# Patient Record
Sex: MALE | Race: WHITE | Employment: OTHER | URBAN - METROPOLITAN AREA
[De-identification: names, ages, dates, MRNs, and addresses within clinical notes are randomized per-mention and may not be internally consistent; named-entity substitution may affect disease eponyms.]

---

## 2024-06-04 ENCOUNTER — HOSPITAL ENCOUNTER (INPATIENT)
Age: 84
LOS: 3 days | Discharge: HOME | DRG: 041 | End: 2024-06-07
Attending: EMERGENCY MEDICINE
Payer: MEDICARE

## 2024-06-04 ENCOUNTER — APPOINTMENT (OUTPATIENT)
Dept: MRI IMAGING | Age: 84
DRG: 041 | End: 2024-06-04
Payer: MEDICARE

## 2024-06-04 ENCOUNTER — APPOINTMENT (OUTPATIENT)
Dept: CT IMAGING | Age: 84
DRG: 041 | End: 2024-06-04
Payer: MEDICARE

## 2024-06-04 DIAGNOSIS — I63.9 CEREBROVASCULAR ACCIDENT (CVA), UNSPECIFIED MECHANISM (HCC): ICD-10-CM

## 2024-06-04 DIAGNOSIS — I63.412 CEREBROVASCULAR ACCIDENT (CVA) DUE TO EMBOLISM OF LEFT MIDDLE CEREBRAL ARTERY (HCC): ICD-10-CM

## 2024-06-04 DIAGNOSIS — I63.9 ISCHEMIC STROKE (HCC): Primary | ICD-10-CM

## 2024-06-04 LAB
ANION GAP SERPL CALCULATED.3IONS-SCNC: 8 MMOL/L (ref 9–16)
ANTI-XA UNFRAC HEPARIN: 0.33 IU/L
ANTI-XA UNFRAC HEPARIN: <0.1 IU/L
BASOPHILS # BLD: 0.05 K/UL (ref 0–0.2)
BASOPHILS NFR BLD: 1 % (ref 0–2)
BUN BLD-MCNC: 28 MG/DL (ref 8–26)
BUN SERPL-MCNC: 22 MG/DL (ref 8–23)
CA-I BLD-SCNC: 1.14 MMOL/L (ref 1.15–1.33)
CALCIUM SERPL-MCNC: 8.7 MG/DL (ref 8.6–10.4)
CHLORIDE BLD-SCNC: 107 MMOL/L (ref 98–107)
CHLORIDE SERPL-SCNC: 107 MMOL/L (ref 98–107)
CK SERPL-CCNC: 137 U/L (ref 39–308)
CO2 BLD CALC-SCNC: 29 MMOL/L (ref 22–30)
CO2 SERPL-SCNC: 25 MMOL/L (ref 20–31)
CREAT SERPL-MCNC: 1.2 MG/DL (ref 0.7–1.2)
EGFR, POC: 60 ML/MIN/1.73M2
EOSINOPHIL # BLD: <0.03 K/UL (ref 0–0.44)
EOSINOPHILS RELATIVE PERCENT: 0 % (ref 1–4)
ERYTHROCYTE [DISTWIDTH] IN BLOOD BY AUTOMATED COUNT: 12.4 % (ref 11.8–14.4)
ERYTHROCYTE [DISTWIDTH] IN BLOOD BY AUTOMATED COUNT: 12.4 % (ref 11.8–14.4)
GFR, ESTIMATED: 60 ML/MIN/1.73M2
GLUCOSE BLD-MCNC: 105 MG/DL (ref 74–100)
GLUCOSE SERPL-MCNC: 108 MG/DL (ref 74–99)
HCO3 VENOUS: 28.9 MMOL/L (ref 22–29)
HCT VFR BLD AUTO: 41.3 % (ref 40.7–50.3)
HCT VFR BLD AUTO: 43.7 % (ref 40.7–50.3)
HCT VFR BLD AUTO: 48 % (ref 41–53)
HGB BLD-MCNC: 13.8 G/DL (ref 13–17)
HGB BLD-MCNC: 14.3 G/DL (ref 13–17)
IMM GRANULOCYTES # BLD AUTO: <0.03 K/UL (ref 0–0.3)
IMM GRANULOCYTES NFR BLD: 0 %
INR PPP: 1.1
INR PPP: 1.1
LYMPHOCYTES NFR BLD: 1 K/UL (ref 1.1–3.7)
LYMPHOCYTES RELATIVE PERCENT: 15 % (ref 24–43)
MCH RBC QN AUTO: 30.5 PG (ref 25.2–33.5)
MCH RBC QN AUTO: 30.7 PG (ref 25.2–33.5)
MCHC RBC AUTO-ENTMCNC: 32.7 G/DL (ref 28.4–34.8)
MCHC RBC AUTO-ENTMCNC: 33.4 G/DL (ref 28.4–34.8)
MCV RBC AUTO: 91.8 FL (ref 82.6–102.9)
MCV RBC AUTO: 93.2 FL (ref 82.6–102.9)
MONOCYTES NFR BLD: 0.44 K/UL (ref 0.1–1.2)
MONOCYTES NFR BLD: 6 % (ref 3–12)
MYOGLOBIN SERPL-MCNC: 65 NG/ML (ref 28–72)
NEUTROPHILS NFR BLD: 78 % (ref 36–65)
NEUTS SEG NFR BLD: 5.34 K/UL (ref 1.5–8.1)
NRBC BLD-RTO: 0 PER 100 WBC
NRBC BLD-RTO: 0 PER 100 WBC
O2 SAT, VEN: 45.3 % (ref 60–85)
PARTIAL THROMBOPLASTIN TIME: 25.3 SEC (ref 23–36.5)
PARTIAL THROMBOPLASTIN TIME: 29.3 SEC (ref 23–36.5)
PCO2, VEN: 50.9 MM HG (ref 41–51)
PH VENOUS: 7.36 (ref 7.32–7.43)
PLATELET # BLD AUTO: 177 K/UL (ref 138–453)
PLATELET # BLD AUTO: 192 K/UL (ref 138–453)
PMV BLD AUTO: 9.5 FL (ref 8.1–13.5)
PMV BLD AUTO: 9.8 FL (ref 8.1–13.5)
PO2, VEN: 26.4 MM HG (ref 30–50)
POC ANION GAP: 7 MMOL/L (ref 7–16)
POC CREATININE: 1.2 MG/DL (ref 0.51–1.19)
POC HEMOGLOBIN (CALC): 16.2 G/DL (ref 13.5–17.5)
POC LACTIC ACID: 0.9 MMOL/L (ref 0.56–1.39)
POSITIVE BASE EXCESS, VEN: 2.2 MMOL/L (ref 0–3)
POTASSIUM BLD-SCNC: 6 MMOL/L (ref 3.5–5.1)
POTASSIUM SERPL-SCNC: 5.4 MMOL/L (ref 3.7–5.3)
PROTHROMBIN TIME: 13.7 SEC (ref 11.7–14.9)
PROTHROMBIN TIME: 13.9 SEC (ref 11.7–14.9)
RBC # BLD AUTO: 4.5 M/UL (ref 4.21–5.77)
RBC # BLD AUTO: 4.69 M/UL (ref 4.21–5.77)
SODIUM BLD-SCNC: 142 MMOL/L (ref 138–146)
SODIUM SERPL-SCNC: 140 MMOL/L (ref 136–145)
TROPONIN I SERPL HS-MCNC: 11 NG/L (ref 0–22)
WBC OTHER # BLD: 6.9 K/UL (ref 3.5–11.3)
WBC OTHER # BLD: 7.2 K/UL (ref 3.5–11.3)

## 2024-06-04 PROCEDURE — 2580000003 HC RX 258: Performed by: STUDENT IN AN ORGANIZED HEALTH CARE EDUCATION/TRAINING PROGRAM

## 2024-06-04 PROCEDURE — 83874 ASSAY OF MYOGLOBIN: CPT

## 2024-06-04 PROCEDURE — 85025 COMPLETE CBC W/AUTO DIFF WBC: CPT

## 2024-06-04 PROCEDURE — 85520 HEPARIN ASSAY: CPT

## 2024-06-04 PROCEDURE — 6370000000 HC RX 637 (ALT 250 FOR IP): Performed by: STUDENT IN AN ORGANIZED HEALTH CARE EDUCATION/TRAINING PROGRAM

## 2024-06-04 PROCEDURE — 99285 EMERGENCY DEPT VISIT HI MDM: CPT

## 2024-06-04 PROCEDURE — 36415 COLL VENOUS BLD VENIPUNCTURE: CPT

## 2024-06-04 PROCEDURE — 99233 SBSQ HOSP IP/OBS HIGH 50: CPT | Performed by: STUDENT IN AN ORGANIZED HEALTH CARE EDUCATION/TRAINING PROGRAM

## 2024-06-04 PROCEDURE — 82565 ASSAY OF CREATININE: CPT

## 2024-06-04 PROCEDURE — 6360000004 HC RX CONTRAST MEDICATION

## 2024-06-04 PROCEDURE — 84520 ASSAY OF UREA NITROGEN: CPT

## 2024-06-04 PROCEDURE — 82947 ASSAY GLUCOSE BLOOD QUANT: CPT

## 2024-06-04 PROCEDURE — 80051 ELECTROLYTE PANEL: CPT

## 2024-06-04 PROCEDURE — 82553 CREATINE MB FRACTION: CPT

## 2024-06-04 PROCEDURE — 0042T CT BRAIN PERFUSION: CPT

## 2024-06-04 PROCEDURE — 2580000003 HC RX 258

## 2024-06-04 PROCEDURE — 85027 COMPLETE CBC AUTOMATED: CPT

## 2024-06-04 PROCEDURE — 80048 BASIC METABOLIC PNL TOTAL CA: CPT

## 2024-06-04 PROCEDURE — NBSRV NON-BILLABLE SERVICE: Performed by: PSYCHIATRY & NEUROLOGY

## 2024-06-04 PROCEDURE — 84484 ASSAY OF TROPONIN QUANT: CPT

## 2024-06-04 PROCEDURE — 6370000000 HC RX 637 (ALT 250 FOR IP)

## 2024-06-04 PROCEDURE — 85014 HEMATOCRIT: CPT

## 2024-06-04 PROCEDURE — 82550 ASSAY OF CK (CPK): CPT

## 2024-06-04 PROCEDURE — 85730 THROMBOPLASTIN TIME PARTIAL: CPT

## 2024-06-04 PROCEDURE — 2000000000 HC ICU R&B

## 2024-06-04 PROCEDURE — 82330 ASSAY OF CALCIUM: CPT

## 2024-06-04 PROCEDURE — 6360000002 HC RX W HCPCS: Performed by: STUDENT IN AN ORGANIZED HEALTH CARE EDUCATION/TRAINING PROGRAM

## 2024-06-04 PROCEDURE — 70551 MRI BRAIN STEM W/O DYE: CPT

## 2024-06-04 PROCEDURE — 83605 ASSAY OF LACTIC ACID: CPT

## 2024-06-04 PROCEDURE — 85610 PROTHROMBIN TIME: CPT

## 2024-06-04 PROCEDURE — 82803 BLOOD GASES ANY COMBINATION: CPT

## 2024-06-04 RX ORDER — ASPIRIN 81 MG/1
81 TABLET, CHEWABLE ORAL DAILY
Status: ON HOLD | COMMUNITY
End: 2024-06-07 | Stop reason: HOSPADM

## 2024-06-04 RX ORDER — POLYETHYLENE GLYCOL 3350 17 G/17G
17 POWDER, FOR SOLUTION ORAL DAILY PRN
Status: DISCONTINUED | OUTPATIENT
Start: 2024-06-04 | End: 2024-06-07 | Stop reason: HOSPADM

## 2024-06-04 RX ORDER — LISINOPRIL 2.5 MG/1
2.5 TABLET ORAL DAILY
COMMUNITY

## 2024-06-04 RX ORDER — ONDANSETRON 4 MG/1
4 TABLET, ORALLY DISINTEGRATING ORAL EVERY 8 HOURS PRN
Status: DISCONTINUED | OUTPATIENT
Start: 2024-06-04 | End: 2024-06-07 | Stop reason: HOSPADM

## 2024-06-04 RX ORDER — CITALOPRAM 20 MG/1
40 TABLET ORAL DAILY
Status: DISCONTINUED | OUTPATIENT
Start: 2024-06-04 | End: 2024-06-07 | Stop reason: HOSPADM

## 2024-06-04 RX ORDER — ROSUVASTATIN CALCIUM 20 MG/1
40 TABLET, COATED ORAL NIGHTLY
Status: DISCONTINUED | OUTPATIENT
Start: 2024-06-04 | End: 2024-06-05

## 2024-06-04 RX ORDER — HEPARIN SODIUM 10000 [USP'U]/100ML
5-30 INJECTION, SOLUTION INTRAVENOUS CONTINUOUS
Status: DISCONTINUED | OUTPATIENT
Start: 2024-06-04 | End: 2024-06-05

## 2024-06-04 RX ORDER — SODIUM CHLORIDE 0.9 % (FLUSH) 0.9 %
5-40 SYRINGE (ML) INJECTION PRN
Status: DISCONTINUED | OUTPATIENT
Start: 2024-06-04 | End: 2024-06-07 | Stop reason: HOSPADM

## 2024-06-04 RX ORDER — CLOPIDOGREL 300 MG/1
300 TABLET, FILM COATED ORAL ONCE
Status: COMPLETED | OUTPATIENT
Start: 2024-06-04 | End: 2024-06-04

## 2024-06-04 RX ORDER — ASPIRIN 81 MG/1
81 TABLET ORAL DAILY
Status: DISCONTINUED | OUTPATIENT
Start: 2024-06-05 | End: 2024-06-04

## 2024-06-04 RX ORDER — TAMSULOSIN HYDROCHLORIDE 0.4 MG/1
0.4 CAPSULE ORAL DAILY
Status: DISCONTINUED | OUTPATIENT
Start: 2024-06-04 | End: 2024-06-07 | Stop reason: HOSPADM

## 2024-06-04 RX ORDER — FAMOTIDINE 20 MG/1
20 TABLET, FILM COATED ORAL 2 TIMES DAILY
COMMUNITY

## 2024-06-04 RX ORDER — ASPIRIN 81 MG/1
81 TABLET ORAL DAILY
Status: DISCONTINUED | OUTPATIENT
Start: 2024-06-04 | End: 2024-06-07 | Stop reason: HOSPADM

## 2024-06-04 RX ORDER — ENOXAPARIN SODIUM 100 MG/ML
40 INJECTION SUBCUTANEOUS DAILY
Status: CANCELLED | OUTPATIENT
Start: 2024-06-04

## 2024-06-04 RX ORDER — FLUTICASONE PROPIONATE 50 MCG
1 SPRAY, SUSPENSION (ML) NASAL DAILY
COMMUNITY
End: 2024-06-04

## 2024-06-04 RX ORDER — ACETAMINOPHEN 325 MG/1
650 TABLET ORAL EVERY 4 HOURS PRN
Status: DISCONTINUED | OUTPATIENT
Start: 2024-06-04 | End: 2024-06-07 | Stop reason: HOSPADM

## 2024-06-04 RX ORDER — SODIUM CHLORIDE 9 MG/ML
INJECTION, SOLUTION INTRAVENOUS CONTINUOUS
Status: DISCONTINUED | OUTPATIENT
Start: 2024-06-04 | End: 2024-06-05

## 2024-06-04 RX ORDER — CLOPIDOGREL BISULFATE 75 MG/1
75 TABLET ORAL DAILY
Status: DISCONTINUED | OUTPATIENT
Start: 2024-06-05 | End: 2024-06-07 | Stop reason: HOSPADM

## 2024-06-04 RX ORDER — TAMSULOSIN HYDROCHLORIDE 0.4 MG/1
0.4 CAPSULE ORAL DAILY
COMMUNITY

## 2024-06-04 RX ORDER — ONDANSETRON 2 MG/ML
4 INJECTION INTRAMUSCULAR; INTRAVENOUS EVERY 6 HOURS PRN
Status: DISCONTINUED | OUTPATIENT
Start: 2024-06-04 | End: 2024-06-07 | Stop reason: HOSPADM

## 2024-06-04 RX ORDER — FAMOTIDINE 20 MG/1
20 TABLET, FILM COATED ORAL 2 TIMES DAILY
Status: DISCONTINUED | OUTPATIENT
Start: 2024-06-04 | End: 2024-06-07

## 2024-06-04 RX ORDER — ROSUVASTATIN CALCIUM 10 MG/1
10 TABLET, COATED ORAL DAILY
Status: ON HOLD | COMMUNITY
End: 2024-06-07 | Stop reason: HOSPADM

## 2024-06-04 RX ORDER — SODIUM CHLORIDE 9 MG/ML
INJECTION, SOLUTION INTRAVENOUS PRN
Status: DISCONTINUED | OUTPATIENT
Start: 2024-06-04 | End: 2024-06-07 | Stop reason: HOSPADM

## 2024-06-04 RX ORDER — CITALOPRAM 40 MG/1
40 TABLET ORAL DAILY
COMMUNITY

## 2024-06-04 RX ORDER — SODIUM CHLORIDE 0.9 % (FLUSH) 0.9 %
5-40 SYRINGE (ML) INJECTION EVERY 12 HOURS SCHEDULED
Status: DISCONTINUED | OUTPATIENT
Start: 2024-06-04 | End: 2024-06-07 | Stop reason: HOSPADM

## 2024-06-04 RX ADMIN — FAMOTIDINE 20 MG: 20 TABLET, FILM COATED ORAL at 13:12

## 2024-06-04 RX ADMIN — IOPAMIDOL 50 ML: 755 INJECTION, SOLUTION INTRAVENOUS at 06:36

## 2024-06-04 RX ADMIN — SODIUM CHLORIDE: 9 INJECTION, SOLUTION INTRAVENOUS at 07:24

## 2024-06-04 RX ADMIN — SODIUM CHLORIDE: 9 INJECTION, SOLUTION INTRAVENOUS at 17:55

## 2024-06-04 RX ADMIN — CITALOPRAM 40 MG: 20 TABLET, FILM COATED ORAL at 14:07

## 2024-06-04 RX ADMIN — ASPIRIN 81 MG: 81 TABLET, COATED ORAL at 13:12

## 2024-06-04 RX ADMIN — CLOPIDOGREL BISULFATE 300 MG: 300 TABLET, FILM COATED ORAL at 07:24

## 2024-06-04 RX ADMIN — TAMSULOSIN HYDROCHLORIDE 0.4 MG: 0.4 CAPSULE ORAL at 14:01

## 2024-06-04 RX ADMIN — SODIUM CHLORIDE, PRESERVATIVE FREE 10 ML: 5 INJECTION INTRAVENOUS at 21:19

## 2024-06-04 RX ADMIN — HEPARIN SODIUM 10 UNITS/KG/HR: 10000 INJECTION, SOLUTION INTRAVENOUS at 11:12

## 2024-06-04 RX ADMIN — FAMOTIDINE 20 MG: 20 TABLET, FILM COATED ORAL at 21:19

## 2024-06-04 RX ADMIN — ROSUVASTATIN CALCIUM 40 MG: 20 TABLET, FILM COATED ORAL at 20:12

## 2024-06-04 ASSESSMENT — ENCOUNTER SYMPTOMS
VOMITING: 0
ABDOMINAL PAIN: 0
SHORTNESS OF BREATH: 0
SORE THROAT: 0
BACK PAIN: 0
NAUSEA: 0
RHINORRHEA: 0

## 2024-06-04 ASSESSMENT — PAIN DESCRIPTION - FREQUENCY: FREQUENCY: CONTINUOUS

## 2024-06-04 ASSESSMENT — PAIN DESCRIPTION - ONSET: ONSET: ON-GOING

## 2024-06-04 ASSESSMENT — PAIN DESCRIPTION - DESCRIPTORS
DESCRIPTORS: TIGHTNESS

## 2024-06-04 ASSESSMENT — PAIN DESCRIPTION - LOCATION
LOCATION: NECK

## 2024-06-04 ASSESSMENT — PAIN DESCRIPTION - PAIN TYPE
TYPE: CHRONIC PAIN

## 2024-06-04 ASSESSMENT — PAIN SCALES - GENERAL
PAINLEVEL_OUTOF10: 2
PAINLEVEL_OUTOF10: 1
PAINLEVEL_OUTOF10: 9

## 2024-06-04 ASSESSMENT — PAIN DESCRIPTION - ORIENTATION
ORIENTATION: MID
ORIENTATION: MID

## 2024-06-04 NOTE — ED PROVIDER NOTES
[facility] ED  Emergency Department Encounter  Emergency Medicine Resident     Pt Name:Guru Ricci  MRN: [id number]  Birthdate 1940  Date of evaluation: 6/4/24  PCP:  No primary care provider on file.  Note Started: 6:25 AM EDT      CHIEF COMPLAINT       Chief Complaint   Patient presents with    Cerebrovascular Accident       HISTORY OF PRESENT ILLNESS  (Location/Symptom, Timing/Onset, Context/Setting, Quality, Duration, Modifying Factors, Severity.)      Guru Ricci is a 83 y.o. male who presents via EMS as transfer from outside facility.  Last known well was 10 PM last night.  Presented to outside facility at 2 AM this morning due to aphasia.  Found to have an NIH of 5.  CT imaging at outside facility showed M1 occlusion.  Was given aspirin at outside facility.  Symptoms improved while at outside facility.  No thrombolytics were given due to improving symptoms.  Patient no other complaints.  No trauma.  No fevers or chills or recent illnesses.  On arrival to the emergency department patient is alert and oriented x 4, GCS 15.  No complaints at this time.    PAST MEDICAL / SURGICAL / SOCIAL / FAMILY HISTORY      has no past medical history on file.     has no past surgical history on file.    Social History     Socioeconomic History    Marital status: Not on file     Spouse name: Not on file    Number of children: Not on file    Years of education: Not on file    Highest education level: Not on file   Occupational History    Not on file   Tobacco Use    Smoking status: Not on file    Smokeless tobacco: Not on file   Substance and Sexual Activity    Alcohol use: Not on file    Drug use: Not on file    Sexual activity: Not on file   Other Topics Concern    Not on file   Social History Narrative    Not on file     Social Determinants of Health     Financial Resource Strain: Not on file   Food Insecurity: Not on file   Transportation Needs: Not on file   Physical Activity: Not on file

## 2024-06-04 NOTE — CONSULTS
Department of Neurology/Telestroke/Stroke  Resident Consult Note  Stroke Alert @ 6:20 am  Arrival at bedside @ 6:20 am    Reason for Consult:  ED Stroke Alert  Requesting Physician:  Dr. Sherwood  Endovascular Neurosurgeon:   Edmundo Lindsay MD    Stroke Team:  Will Sheehan MD      History Obtained From:  patient, electronic medical record    CHIEF COMPLAINT:       Fell out of bed    HISTORY OF PRESENT ILLNESS:       The patient is a 83 y.o. male who presents from [facility] after he was found to have a left distal M1 MCA occlusion.  Reportedly patient went to bed around 10 PM last night and fell out of bed in the middle of the night.  Wife and noted the patient was not speaking like himself and brought him to the hospital.  Initial NIH was 3.  CT head was reportedly normal, CTA showed left MCA M1 occlusion.  Patient was given 1 L normal saline bolus, loaded with aspirin 324 and transferred to [facility].    On arrival NIH is 2 for mild aphasia and mild extinction on the right.  CT perfusion was completed and shows a 73 cc mismatch in the left MCA territory.        LKW: 10 PM 6/3/2024  NIHSS: 2  Modified Owen Scale: 0  SBP: 151  Glucose: 105  CT head without contrast: No acute intracranial abnormality  TNK: Outside window  Endovascular: Left M1 MCA       PAST MEDICAL HISTORY :       Past Medical History:    No past medical history on file.    Past Surgical History:    No past surgical history on file.    Social History:   Social History     Socioeconomic History    Marital status: Not on file     Spouse name: Not on file    Number of children: Not on file    Years of education: Not on file    Highest education level: Not on file   Occupational History    Not on file   Tobacco Use    Smoking status: Not on file    Smokeless tobacco: Not on file   Substance and Sexual Activity    Alcohol use: Not on file    Drug use: Not on file    Sexual activity: Not on file   Other Topics Concern    Not on file  despite symptoms; able to carry out all usual duties and activities   [] 2: Slight disability; unable to carry out all previous activities, but able to look after own affairs without assistance   [] 3:Moderate disability; requiring some help, but able to walk without assistance   [] 4: Moderately severe disability; unable to walk and attend to bodily needs without assistance   [] 5:Severe disability; bedridden, incontinent and requiring constant nursing care and attention        LABS AND IMAGING:   CBC with Differential:  No results found for: \"WBC\", \"RBC\", \"HGB\", \"HCT\", \"PLT\", \"MCV\", \"MCH\", \"MCHC\", \"RDW\", \"NRBC\", \"BANDSPCT\", \"BLASTSPCT\", \"METASPCT\", \"LYMPHOPCT\", \"PROMYELOPCT\", \"MONOPCT\", \"MYELOPCT\", \"EOSPCT\", \"BASOPCT\", \"MONOSABS\", \"LYMPHSABS\", \"EOSABS\", \"BASOSABS\", \"DIFFTYPE\"  BMP:  No results found for: \"NA\", \"K\", \"CL\", \"CO2\", \"BUN\", \"LABALBU\", \"CREATININE\", \"CALCIUM\", \"GFRAA\", \"LABGLOM\", \"GLUCOSE\", \"GLU\"  No results found for: \"LABA1C\"  No results found for: \"LDL\", \"LDLDIRECT\"    Radiology Review:  CT perfusion: 73 cc mismatch in left MCA territory      Assessment:       Guru Ricci is a 83 y.o. male presents with aphasia and is found to have a left distal M1 occlusion.  Transferred to [facility] for evaluation by endovascular neurosurgery.      Left distal MCA M1 occlusion      Last Known Well (date and time)   10 PM 6/3/2024     Candidate for IV Tenecteplase therapy    Yes []  Risks including 6% of sich/death, benefits of potential improved thrombolysis, and alternatives to IV thrombolytics discussed with patient and/or family.    No   [x] due to the following exclusion criteria: Last well more than 4.5 hrs   Candidate for Thrombectomy   Yes [x]    No  [] due to the following exclusion criteria:       Disposition   [] General Neurology Care Status - prefer 1st floor (1C)   [] Internal Medicine General Care Status   [] NICU Status - (1B)     [] MICU Status   [] Observation Status    Stroke

## 2024-06-04 NOTE — ED NOTES
ED to inpatient nurses report      Chief Complaint:  Chief Complaint   Patient presents with    Cerebrovascular Accident     Present to ED from: Pt presents to ED from [facility] via [facility]4 for CVA sx. Pt's last known well was approximately 2200. Pt's spouse heard him moving around when he fell out of bed this morning. Pt denies n/v/d, LOC, CP, SOB, fever, chills, recent illness, change in bowel/bladder function, loss of appetite, or any other sx.  Pt is c/o chronic neck pain 9/10 currently, states that it feels like it is stiff. Pt was seen at [facility] and send to ED for further evaluation for thrombus of Left M1 and into both M2 seen on CTA.     MOA:     LOC: alert and orientated to name, place, date  Mobility: Requires assistance * 1  Oxygen Baseline: RA    Current needs required: RA   Pending ED orders:   Present condition: A&Ox4, resting comfortably on stretcher, NAD.     Why did the patient come to the ED? CVA   What is the plan? ADMIT,    Any procedures or intervention occur? Potentially vascular for thrombectomy    Mental Status:  Level of Consciousness: Alert (0)    Psych Assessment:   Psychosocial  Psychosocial (WDL): Within Defined Limits  Vital signs   Vitals:    06/04/24 0626 06/04/24 0628 06/04/24 0645 06/04/24 0705   BP: (!) 151/81  135/86 139/74   Pulse:   71 61   Resp:   14 18   Temp:  98 °F (36.7 °C)     TempSrc:  Oral     SpO2:   94% 96%   Weight:            Vitals:  Patient Vitals for the past 24 hrs:   BP Temp Temp src Pulse Resp SpO2 Weight   06/04/24 0705 139/74 -- -- 61 18 96 % --   06/04/24 0645 135/86 -- -- 71 14 94 % --   06/04/24 0628 -- 98 °F (36.7 °C) Oral -- -- -- --   06/04/24 0626 (!) 151/81 -- -- -- -- -- --   06/04/24 0625 -- -- -- -- -- -- 99.8 kg (220 lb)   06/04/24 0624 -- -- Oral 63 18 94 % --      Visit Vitals  /74   Pulse 61   Temp 98 °F (36.7 °C) (Oral)   Resp 18   Wt 99.8 kg (220 lb)   SpO2 96%        LDAs:   Peripheral IV 06/04/24 Right Forearm (Active)   Site  other components within normal limits   VENOUS BLOOD GAS, POINT OF CARE - Abnormal; Notable for the following components:    pO2, Larry 26.4 (*)     O2 Sat, Larry 45.3 (*)     All other components within normal limits   CREATININE W/GFR POINT OF CARE - Abnormal; Notable for the following components:    POC Creatinine 1.2 (*)     All other components within normal limits   UREA N (POC) - Abnormal; Notable for the following components:    POC BUN 28 (*)     All other components within normal limits   POCT GLUCOSE - Abnormal; Notable for the following components:    POC Glucose 105 (*)     All other components within normal limits   HGB/HCT   LACTIC ACID,POINT OF CARE       Electronically signed by Nori Delgado RN on 6/4/2024 at 7:16 AM

## 2024-06-04 NOTE — ED PROVIDER NOTES
[facility]   Emergency Department  Faculty Attestation       I performed a history and physical examination of the patient and discussed management with the resident. I reviewed the resident’s note and agree with the documented findings including all diagnostic interpretations and plan of care. Any areas of disagreement are noted on the chart. I was personally present for the key portions of any procedures. I have documented in the chart those procedures where I was not present during the key portions. I have reviewed the emergency nurses triage note. I agree with the chief complaint, past medical history, past surgical history, allergies, medications, social and family history as documented unless otherwise noted below.  For Physician Assistant/ Nurse Practitioner cases/documentation I have personally evaluated this patient and have completed at least one if not all key elements of the E/M (history, physical exam, and MDM). Additional findings are as noted.    Patient Name: Guru Ricci  MRN: [id number]  : 1940  Primary Care Physician: Bentley Bauer DO    Date of evaluationa: 2024   Note Started: 6:51 AM EDT    Pertinent Comments     Chief Complaint:   Chief Complaint   Patient presents with    Cerebrovascular Accident        Initial vitals: (If not listed, please see nursing documentation)  ED Triage Vitals   BP Temp Temp Source Pulse Respirations SpO2 Height Weight - Scale   24 0626 24 0628 24 0624 24 0624 24 0624 24 0624 -- 24 0625   (!) 151/81 98 °F (36.7 °C) Oral 63 18 94 %  99.8 kg (220 lb)        HPI/PE/Impression:  This is a 83 y.o. male who [facility]  Went to bed around 10 PM wife woke up to him acting abnormally.  Expressive aphasia initially.  However NIH significantly improved at [facility] facility.  CTA showed thrombus in the M1 bridging into the M2.   Transferred here for evaluation.  On exam, patient does not have any complaints at this time.  Answering questions.  Heart sounds regular.  Lungs difficult to auscultate.  Moving extremities.    Medical Decision Making  Stroke alert  Stroke team at bedside upon arrival.  Patient does still have some aphasia, some dysarthria, and mild facial droop.  Patient outside window for TNK, was not given TNK at outlying facility given that his NIH is significantly improved without invention.    Amount and/or Complexity of Data Reviewed  Independent Historian: EMS  Labs: ordered. Decision-making details documented in ED Course.    Risk  Decision regarding hospitalization.         Critical Care: There was significant risk of life threatening deterioration of patient's condition requiring my direct management. Critical care time 10 minutes, excluding any documented procedures.    Kassy Sherwood MD  Attending Emergency Physician        Kassy Sherwood MD  06/04/24 0652       Kassy Sherwood MD  06/04/24 0703

## 2024-06-04 NOTE — ED NOTES
Pt is incontinent of urine and is cleaned up, complete linen change, changed into a new brief and gown.

## 2024-06-04 NOTE — ED NOTES

## 2024-06-04 NOTE — ED NOTES
Julian contacted by [facility] to assist family to neuro ICU waiting room.

## 2024-06-04 NOTE — PROGRESS NOTES
[facility]ALTH - [facility]  PROGRESS NOTE    Shift date: 6/4/2024   Shift day: Tuesday   Shift # 1    Room # 15/15   Name: Guru Ricci                Mu-ism:    Place of Lutheran:     Referral:   requested to escort family to waiting room on 1B    Admit Date & Time: 6/4/2024  6:23 AM    Assessment:  Guru Ricci is a 83 y.o. male. Pt's wife, [place], and sister-in-law, Maria Esther, were present. They appeared calm and to be coping.      Intervention:  Writer introduced self and title as . Family engaged well in conversation as they walked to the waiting room.  provided support through active listening and words of affirmation.      Outcome:  Family was receptive to spiritual health visit and expressed gratitude for support.     Plan:  Chaplains will remain available to offer spiritual and emotional support as needed.      Electronically signed by Chaplain Oziel, on 6/4/2024 at 11:11 AM.  [facility]  [phone]      06/04/24 1108   Encounter Summary   Encounter Overview/Reason Family Care   Service Provided For Family   Referral/Consult From Nurse   Support System Spouse;Family members   Last Encounter  06/04/24   Complexity of Encounter Low   Begin Time 1055   End Time  1110   Total Time Calculated 15 min   Crisis   Type Family Care   Assessment/Intervention/Outcome   Assessment Calm;Coping   Intervention Active listening;Explored/Affirmed feelings, thoughts, concerns  (Escort to waiting room)   Outcome Engaged in conversation;Expressed feelings, needs, and concerns;Expressed Gratitude;Receptive

## 2024-06-04 NOTE — ED NOTES
Walter Ricci  [birth year]  82 yo M   LKW 10 pm  Wife heard him stumbling around  Aphasic  NIH of 5 initially   Now NIH of 1-2  CTA thrombus in distal left M1 into both M2  Spoke to stroke team   Plan to go like thrombectomy       Stroke team requesting transfer to ED

## 2024-06-04 NOTE — ED TRIAGE NOTES
Pt presents to ED from [facility] via LF4 for CVA sx. Pt's last known well was approximately 2200. Pt's spouse heard him moving around when he fell out of bed this morning. Pt denies n/v/d, LOC, CP, SOB, fever, chills, recent illness, change in bowel/bladder function, loss of appetite, or any other sx.  Pt is c/o chronic neck pain 9/10 currently, states that it feels like it is stiff. Pt was seen at [facility] and send to ED for further evaluation for thrombus of Left M1 and into both M2 seen on CTA.     Pt is A&OX4, placed on full monitor, IV established, changed into gown and given warm blankets. Labs drawn, labeled, and sent to lab. EPOC ran at bedside. Pt vitals show HTN but otherwise WNL. NIH scale assessed. Stroke team at bedside, CT scan called.White board updated, and patient is updated on plan of care. Will continue to monitor.

## 2024-06-04 NOTE — ED PROVIDER NOTES
[facility]  FACULTY HANDOFF     7:27 AM EDT  Handoff taken on the following patient from prior Attending Physician:  Pt Name: Guru Ricci  PCP:  Bentley Bauer DO    Attestation  I was available and discussed any additional care issues that arose and coordinated the management plans with the resident(s) caring for the patient during my duty period. Any areas of disagreement with resident's documentation of care or procedures are noted on the chart. I was personally present for the key portions of any/all procedures during my duty period. I have documented in the chart those procedures where I was not present during the key portions.         CHIEF COMPLAINT       Chief Complaint   Patient presents with    Cerebrovascular Accident         CURRENT MEDICATIONS     Previous Medications  Previous Medications    CITALOPRAM (CELEXA) 40 MG TABLET    Take 1 tablet by mouth daily    FAMOTIDINE (PEPCID) 20 MG TABLET    Take 1 tablet by mouth 2 times daily    FLUTICASONE (FLONASE) 50 MCG/ACT NASAL SPRAY    1 spray by Each Nostril route daily    LISINOPRIL (PRINIVIL;ZESTRIL) 2.5 MG TABLET    Take 1 tablet by mouth daily    ROSUVASTATIN (CRESTOR) 10 MG TABLET    Take 1 tablet by mouth daily       Encounter Medications  Orders Placed This Encounter   Medications    iopamidol (ISOVUE-370) 76 % injection 50 mL    clopidogrel (PLAVIX) tablet 300 mg    0.9 % sodium chloride infusion       ALLERGIES     is allergic to sulfa antibiotics.      RECENT VITALS:   Temp: 98 °F (36.7 °C),  Pulse: 61, Respirations: 18, BP: 139/74    RADIOLOGY:   CT BRAIN PERFUSION    (Results Pending)       LABS:  Labs Reviewed   STROKE PANEL - Abnormal; Notable for the following components:       Result Value    Potassium 5.4 (*)     Anion Gap 8 (*)     Glucose 108 (*)     Est, Glom Filt Rate 60 (*)     Neutrophils % 78 (*)     Lymphocytes % 15 (*)     Eosinophils % 0 (*)     Lymphocytes Absolute 1.00 (*)     All other

## 2024-06-04 NOTE — CONSULTS
Endovascular Neurosurgery Consult      Reason for evaluation: left MCA m1m2 occlusion    SUBJECTIVE:   History of Chief Complaint:      Patient is an 82 yo male with PMH of hypertension, hyperlipidemia, prostate cancer on surveillance, hypothyroidism, who present as  transfer from [facility] as  stroke alert after his found to have intraluminal thrombus in the distal m1 and proximal m2 segment  of left MCA.     Reportedly patient was doing well when he went to bed at 10 PM on 6/3/2024. This morning wife heard patient falling down and he was unable to stand up with some component of difficulty speaking suggestive of aphasia. Reports in [facility] chart, that patient lost consciousness.     [facility] NIHSS was 5 initially (2 for LOC questions, 2 for aphasia and dysarthria).  On my evaluation patient had NIHSS of 4 as detailed below. Afterwards on reexamination patient NIHSS improved to 2.         Allergies  is allergic to sulfa antibiotics.  Medications  Prior to Admission medications    Medication Sig Start Date End Date Taking? Authorizing Provider   rosuvastatin (CRESTOR) 10 MG tablet Take 1 tablet by mouth daily   Yes Danika Sarah MD   fluticasone (FLONASE) 50 MCG/ACT nasal spray 1 spray by Each Nostril route daily  Patient not taking: Reported on 6/4/2024   Yes Danika Sarah MD   famotidine (PEPCID) 20 MG tablet Take 1 tablet by mouth 2 times daily   Yes Danika Sarah MD   citalopram (CELEXA) 40 MG tablet Take 1 tablet by mouth daily   Yes Danika Sarah MD   lisinopril (PRINIVIL;ZESTRIL) 2.5 MG tablet Take 1 tablet by mouth daily   Yes ProviderDanika MD    Scheduled Meds:  Continuous Infusions:  PRN Meds:.  Past Medical History   has no past medical history on file.  Past Surgical History   has no past surgical history on file.  Social History   has no history on file for tobacco use.   has no history on file for alcohol use.   has no history on file for drug use.  Family

## 2024-06-04 NOTE — H&P
Neuro ICU History & Physical    Patient Name: Guru Ricci  Patient : 1940  Room/Bed: [facility]0122-01  Code Status: Full  Allergies:   Allergies   Allergen Reactions    Sulfa Antibiotics        CHIEF COMPLAINT     Right-sided weakness with aphasia    HPI    History Obtained From: Patient, EMR    The patient is a 83 y.o. male with PMH of HTN, BPH, HLD, GERD, presented after acute onset of difficulty with ambulation and speech this morning upon waking up.  Patient states he was feeling fine last night when he went to bed around 10 PM.  Symptoms were initially noted by the patient and his wife and he was taken to [facility] ED where he was evaluated with CT head and CTA head and neck.  CTA head and neck showed distal M1/proximal M2 segment intraluminal thrombus.  Patient's initial NIH was 5 at the outlying facility.  Telestroke team was consulted and patient was transferred to [facility] for further evaluation.  Upon arrival patient had an improved NIH of 3-4.  Patient underwent CT brain perfusion which showed 10 cc of core infarct with 73 cc of penumbra.  After extensive discussion neuroendovascular team decided to not intervene and to manage patient medically using triple therapy.    Upon initial evaluation patient appeared to be experiencing mild to moderate aphasia with mild dysarthria, no motor deficits were noted.  Patient was loaded with Plavix and started on low intensity neuro based heparin infusion protocol.    Admitted to ICU From: ED  Reason for ICU Admission: Close monitoring on triple therapy       PATIENT HISTORY   Past Medical History:    History reviewed. No pertinent past medical history.    Past Surgical History:    History reviewed. No pertinent surgical history.    Social History:   Social History     Socioeconomic History    Marital status:      Spouse name: Not on file    Number of children: Not on file    Years of education: Not on file    Highest education level:  EOMI.   ENT:  moist mucous membranes   LUNGS:  Equal air entry bilaterally   CARDIOVASCULAR:  normal s1 / s2   ABDOMEN:  Soft, no rigidity   NECK supple, symmetric, no midline tenderness to palpation    BACK without midline tenderness, step-offs or deformities    EXTREMITIES Normal ROM with no deformities   NEUROLOGIC:  Mental Status:  A & O x3,awake             Cranial Nerves:    cranial nerves II-XII are grossly intact except mild right facial asymmetry    Motor Exam:    Drift:  absent  Tone:  normal    Motor exam is symmetrical 5 out of 5 all extremities bilaterally    Sensory:    Touch:    Right Upper Extremity:  normal  Left Upper Extremity:  normal  Right Lower Extremity:  normal  Left Lower Extremity:  normal    Coordination/Dysmetria:  Unable to complete due to aphasia    Gait: Deferred   SKIN No obvious ecchymosis, rashes, or lesions    NIH Stroke Scale Total (if not done complete detailed one below):    1a.  Level of consciousness:  0 - alert; keenly responsive  1b.  Level of consciousness questions:  0 - answers both questions correctly  1c.  Level of consciousness questions:  0 - performs both tasks correctly  2.    Best Gaze:  0 - normal  3.    Visual:  0 - no visual loss  4.    Facial Palsy:  1 - minor paralysis (flattened nasolabial fold, asymmetric on smiling)  5a.  Motor left arm:  0 - no drift, limb holds 90 (or 45) degrees for full 10 seconds  5b.  Motor right arm:  0 - no drift, limb holds 90 (or 45) degrees for full 10 seconds  6a.  Motor left le - no drift; leg holds 30 degree position for full 5 seconds  6b.  Motor right le - no drift; leg holds 30 degree position for full 5 seconds  7.    Limb Ataxia:  0 - absent  8.    Sensory:  0 - normal; no sensory loss  9.    Best Language:  1 - mild to moderate aphasia; some obvious loss of fluency or facility of comprehension without significant limitation on ideas expressed or form of expression.  Reduction of speech and/or comprehension,

## 2024-06-05 ENCOUNTER — APPOINTMENT (OUTPATIENT)
Dept: CT IMAGING | Age: 84
DRG: 041 | End: 2024-06-05
Payer: MEDICARE

## 2024-06-05 PROBLEM — I63.412 CEREBROVASCULAR ACCIDENT (CVA) DUE TO EMBOLISM OF LEFT MIDDLE CEREBRAL ARTERY (HCC): Status: ACTIVE | Noted: 2024-06-05

## 2024-06-05 LAB
ANION GAP SERPL CALCULATED.3IONS-SCNC: 11 MMOL/L (ref 9–16)
ANTI-XA UNFRAC HEPARIN: 0.47 IU/L
ANTI-XA UNFRAC HEPARIN: 0.64 IU/L
BACTERIA URNS QL MICRO: ABNORMAL
BASOPHILS # BLD: 0.07 K/UL (ref 0–0.2)
BASOPHILS NFR BLD: 1 % (ref 0–2)
BILIRUB UR QL STRIP: NEGATIVE
BUN SERPL-MCNC: 14 MG/DL (ref 8–23)
CA-I BLD-SCNC: 1.08 MMOL/L (ref 1.13–1.33)
CALCIUM SERPL-MCNC: 8.3 MG/DL (ref 8.6–10.4)
CASTS #/AREA URNS LPF: ABNORMAL /LPF (ref 0–8)
CHLORIDE SERPL-SCNC: 107 MMOL/L (ref 98–107)
CHOLEST SERPL-MCNC: 120 MG/DL (ref 0–199)
CHOLESTEROL/HDL RATIO: 2
CLARITY UR: CLEAR
CO2 SERPL-SCNC: 22 MMOL/L (ref 20–31)
COLOR UR: ABNORMAL
CREAT SERPL-MCNC: 1 MG/DL (ref 0.7–1.2)
EOSINOPHIL # BLD: 0.19 K/UL (ref 0–0.44)
EOSINOPHILS RELATIVE PERCENT: 3 % (ref 1–4)
EPI CELLS #/AREA URNS HPF: ABNORMAL /HPF (ref 0–5)
ERYTHROCYTE [DISTWIDTH] IN BLOOD BY AUTOMATED COUNT: 12.4 % (ref 11.8–14.4)
EST. AVERAGE GLUCOSE BLD GHB EST-MCNC: 111 MG/DL
GFR, ESTIMATED: 73 ML/MIN/1.73M2
GLUCOSE SERPL-MCNC: 100 MG/DL (ref 74–99)
GLUCOSE UR STRIP-MCNC: NEGATIVE MG/DL
HBA1C MFR BLD: 5.5 % (ref 4–6)
HCT VFR BLD AUTO: 41.9 % (ref 40.7–50.3)
HCT VFR BLD AUTO: 42.6 % (ref 40.7–50.3)
HDLC SERPL-MCNC: 50 MG/DL
HGB BLD-MCNC: 13.2 G/DL (ref 13–17)
HGB BLD-MCNC: 13.7 G/DL (ref 13–17)
HGB UR QL STRIP.AUTO: ABNORMAL
IMM GRANULOCYTES # BLD AUTO: 0.05 K/UL (ref 0–0.3)
IMM GRANULOCYTES NFR BLD: 1 %
KETONES UR STRIP-MCNC: NEGATIVE MG/DL
LDLC SERPL CALC-MCNC: 61 MG/DL (ref 0–100)
LEUKOCYTE ESTERASE UR QL STRIP: NEGATIVE
LYMPHOCYTES NFR BLD: 2.07 K/UL (ref 1.1–3.7)
LYMPHOCYTES RELATIVE PERCENT: 29 % (ref 24–43)
MCH RBC QN AUTO: 30.8 PG (ref 25.2–33.5)
MCHC RBC AUTO-ENTMCNC: 32.2 G/DL (ref 28.4–34.8)
MCV RBC AUTO: 95.7 FL (ref 82.6–102.9)
MONOCYTES NFR BLD: 0.73 K/UL (ref 0.1–1.2)
MONOCYTES NFR BLD: 10 % (ref 3–12)
NEUTROPHILS NFR BLD: 56 % (ref 36–65)
NEUTS SEG NFR BLD: 3.96 K/UL (ref 1.5–8.1)
NITRITE UR QL STRIP: NEGATIVE
NRBC BLD-RTO: 0 PER 100 WBC
PH UR STRIP: 7 [PH] (ref 5–8)
PLATELET # BLD AUTO: 165 K/UL (ref 138–453)
PMV BLD AUTO: 10 FL (ref 8.1–13.5)
POTASSIUM SERPL-SCNC: 4.2 MMOL/L (ref 3.7–5.3)
PROT UR STRIP-MCNC: ABNORMAL MG/DL
RBC # BLD AUTO: 4.45 M/UL (ref 4.21–5.77)
RBC #/AREA URNS HPF: ABNORMAL /HPF (ref 0–4)
SODIUM SERPL-SCNC: 140 MMOL/L (ref 136–145)
SP GR UR STRIP: 1.01 (ref 1–1.03)
TRIGL SERPL-MCNC: 49 MG/DL
UROBILINOGEN UR STRIP-ACNC: NORMAL EU/DL (ref 0–1)
VLDLC SERPL CALC-MCNC: 10 MG/DL
WBC #/AREA URNS HPF: ABNORMAL /HPF (ref 0–5)
WBC OTHER # BLD: 7.1 K/UL (ref 3.5–11.3)

## 2024-06-05 PROCEDURE — 2580000003 HC RX 258

## 2024-06-05 PROCEDURE — 81001 URINALYSIS AUTO W/SCOPE: CPT

## 2024-06-05 PROCEDURE — 80061 LIPID PANEL: CPT

## 2024-06-05 PROCEDURE — 36415 COLL VENOUS BLD VENIPUNCTURE: CPT

## 2024-06-05 PROCEDURE — 97535 SELF CARE MNGMENT TRAINING: CPT

## 2024-06-05 PROCEDURE — 2060000000 HC ICU INTERMEDIATE R&B

## 2024-06-05 PROCEDURE — 82330 ASSAY OF CALCIUM: CPT

## 2024-06-05 PROCEDURE — 85025 COMPLETE CBC W/AUTO DIFF WBC: CPT

## 2024-06-05 PROCEDURE — 83036 HEMOGLOBIN GLYCOSYLATED A1C: CPT

## 2024-06-05 PROCEDURE — 6370000000 HC RX 637 (ALT 250 FOR IP): Performed by: STUDENT IN AN ORGANIZED HEALTH CARE EDUCATION/TRAINING PROGRAM

## 2024-06-05 PROCEDURE — 87086 URINE CULTURE/COLONY COUNT: CPT

## 2024-06-05 PROCEDURE — 80048 BASIC METABOLIC PNL TOTAL CA: CPT

## 2024-06-05 PROCEDURE — 97166 OT EVAL MOD COMPLEX 45 MIN: CPT

## 2024-06-05 PROCEDURE — 97530 THERAPEUTIC ACTIVITIES: CPT

## 2024-06-05 PROCEDURE — 85014 HEMATOCRIT: CPT

## 2024-06-05 PROCEDURE — 99222 1ST HOSP IP/OBS MODERATE 55: CPT | Performed by: STUDENT IN AN ORGANIZED HEALTH CARE EDUCATION/TRAINING PROGRAM

## 2024-06-05 PROCEDURE — 74178 CT ABD&PLV WO CNTR FLWD CNTR: CPT | Performed by: PHYSICIAN ASSISTANT

## 2024-06-05 PROCEDURE — 85520 HEPARIN ASSAY: CPT

## 2024-06-05 PROCEDURE — 95813 EEG EXTND MNTR 61-119 MIN: CPT

## 2024-06-05 PROCEDURE — 2580000003 HC RX 258: Performed by: STUDENT IN AN ORGANIZED HEALTH CARE EDUCATION/TRAINING PROGRAM

## 2024-06-05 PROCEDURE — 6360000004 HC RX CONTRAST MEDICATION: Performed by: PHYSICIAN ASSISTANT

## 2024-06-05 PROCEDURE — 99233 SBSQ HOSP IP/OBS HIGH 50: CPT | Performed by: STUDENT IN AN ORGANIZED HEALTH CARE EDUCATION/TRAINING PROGRAM

## 2024-06-05 PROCEDURE — 92523 SPEECH SOUND LANG COMPREHEN: CPT

## 2024-06-05 PROCEDURE — 85018 HEMOGLOBIN: CPT

## 2024-06-05 PROCEDURE — 99223 1ST HOSP IP/OBS HIGH 75: CPT | Performed by: PSYCHIATRY & NEUROLOGY

## 2024-06-05 PROCEDURE — 97161 PT EVAL LOW COMPLEX 20 MIN: CPT

## 2024-06-05 RX ORDER — ENOXAPARIN SODIUM 100 MG/ML
40 INJECTION SUBCUTANEOUS DAILY
Status: DISCONTINUED | OUTPATIENT
Start: 2024-06-05 | End: 2024-06-07 | Stop reason: HOSPADM

## 2024-06-05 RX ORDER — LABETALOL HYDROCHLORIDE 5 MG/ML
10 INJECTION, SOLUTION INTRAVENOUS EVERY 4 HOURS PRN
Status: DISCONTINUED | OUTPATIENT
Start: 2024-06-05 | End: 2024-06-07 | Stop reason: HOSPADM

## 2024-06-05 RX ORDER — ROSUVASTATIN CALCIUM 10 MG/1
10 TABLET, COATED ORAL NIGHTLY
Status: DISCONTINUED | OUTPATIENT
Start: 2024-06-05 | End: 2024-06-07

## 2024-06-05 RX ADMIN — ASPIRIN 81 MG: 81 TABLET, COATED ORAL at 08:18

## 2024-06-05 RX ADMIN — ROSUVASTATIN CALCIUM 10 MG: 10 TABLET, COATED ORAL at 21:05

## 2024-06-05 RX ADMIN — FAMOTIDINE 20 MG: 20 TABLET, FILM COATED ORAL at 21:05

## 2024-06-05 RX ADMIN — FAMOTIDINE 20 MG: 20 TABLET, FILM COATED ORAL at 08:18

## 2024-06-05 RX ADMIN — IOPAMIDOL 130 ML: 755 INJECTION, SOLUTION INTRAVENOUS at 16:01

## 2024-06-05 RX ADMIN — SODIUM CHLORIDE, PRESERVATIVE FREE 10 ML: 5 INJECTION INTRAVENOUS at 21:06

## 2024-06-05 RX ADMIN — SODIUM CHLORIDE, PRESERVATIVE FREE 10 ML: 5 INJECTION INTRAVENOUS at 08:20

## 2024-06-05 RX ADMIN — SODIUM CHLORIDE: 9 INJECTION, SOLUTION INTRAVENOUS at 03:58

## 2024-06-05 RX ADMIN — CLOPIDOGREL BISULFATE 75 MG: 75 TABLET ORAL at 08:18

## 2024-06-05 RX ADMIN — CITALOPRAM 40 MG: 20 TABLET, FILM COATED ORAL at 08:21

## 2024-06-05 RX ADMIN — TAMSULOSIN HYDROCHLORIDE 0.4 MG: 0.4 CAPSULE ORAL at 08:21

## 2024-06-05 ASSESSMENT — PAIN DESCRIPTION - DESCRIPTORS: DESCRIPTORS: ACHING

## 2024-06-05 ASSESSMENT — PAIN SCALES - GENERAL
PAINLEVEL_OUTOF10: 0
PAINLEVEL_OUTOF10: 8

## 2024-06-05 ASSESSMENT — PAIN DESCRIPTION - ORIENTATION: ORIENTATION: POSTERIOR

## 2024-06-05 ASSESSMENT — PAIN DESCRIPTION - LOCATION: LOCATION: NECK

## 2024-06-05 ASSESSMENT — ENCOUNTER SYMPTOMS
SHORTNESS OF BREATH: 0
ABDOMINAL PAIN: 0

## 2024-06-05 NOTE — PROGRESS NOTES
malnutrition  []16-16.99 Moderate malnutrition  []17-18.49 Mild malnutrition  []18.5-24.9 Normal  []25-29.9 Overweight (not obese)  []30-34.9 Obese class 1 (Low Risk)  []35-39.9 Obese class 2 (Moderate Risk)  []?40 Obese class 3 (High Risk)    RECENT LABS:   Lab Results   Component Value Date    WBC 7.1 06/05/2024    HGB 13.7 06/05/2024    HCT 42.6 06/05/2024     06/05/2024    CHOL 120 06/05/2024    TRIG 49 06/05/2024    HDL 50 06/05/2024     06/05/2024    K 4.2 06/05/2024     06/05/2024    CREATININE 1.0 06/05/2024    BUN 14 06/05/2024    CO2 22 06/05/2024    INR 1.1 06/04/2024     24 HOUR INTAKE/OUTPUT:  Intake/Output Summary (Last 24 hours) at 6/5/2024 1147  Last data filed at 6/5/2024 1040  Gross per 24 hour   Intake 3149.47 ml   Output 1150 ml   Net 1999.47 ml       IMAGING:     MRI BRAIN WO CONTRAST   Final Result   Chronic microvascular disease without definite acute intracranial abnormality.         CT BRAIN PERFUSION   Final Result   Large perfusion mismatch in the left MCA territory.         CT BRAIN PERFUSION    (Results Pending)   CT UROGRAM    (Results Pending)         Labs and Images reviewed with:  [x] Dr. Dana Cobos  [] Dr. Carson Tyler  [] Dr. Kristian Echavarria  [] There are no new interval images to review.     PHYSICAL EXAM       CONSTITUTIONAL:  Well developed, well nourished, alert and oriented x 3, in no acute distress. GCS 15. Nontoxic. No dysarthria.  No aphasia.  Patient is hard of hearing but able to follow commands once he understands them.   HEAD:  normocephalic, atraumatic    EYES:  PERRLA, EOMI.   ENT:  moist mucous membranes   LUNGS:  Equal air entry bilaterally   CARDIOVASCULAR:  normal s1 / s2   ABDOMEN:  Soft, no rigidity   NECK supple, symmetric, no midline tenderness to palpation    BACK without midline tenderness, step-offs or deformities    EXTREMITIES Normal ROM with no deformities   NEUROLOGIC:  Mental Status:  A & O x3,awake             Cranial Nerves:     cranial nerves II-XII are grossly intact except mild right facial asymmetry     Motor Exam:    Drift:  absent  Tone:  normal     Motor exam is symmetrical 5 out of 5 all extremities bilaterally     Sensory:    Touch:    Right Upper Extremity:  normal  Left Upper Extremity:  normal  Right Lower Extremity:  normal  Left Lower Extremity:  normal     Coordination/Dysmetria:  Unable to complete due to aphasia     Gait: Deferred   SKIN No obvious ecchymosis, rashes, or lesions      DRAINS:  [x] There are no drains for Neuro Critical Care to monitor at this time.     ASSESSMENT AND PLAN:       This is a 83 y.o. male presenting with acute onset difficulty ambulating and speaking noted by his wife.  Found to have left M1/M2 occlusion on CTA with intraluminal thrombus.  Exam improved hence no intervention.  Admitted to neuro ICU on triple therapy.      NEUROLOGIC:  - CTA head and neck with contrast at outside hospital showed left distal M1/proximal M2 occlusion  - CT brain perfusion showed 83 cc of hypoperfusion with 73 cc of penumbra with mismatch ratio 8.3 in the left MCA territory.  - MRI brain without contrast unremarkable for any acute infarct  - Repeat CT brain perfusion tomorrow to evaluate area of hypoperfusion  - Continue aspirin 81 mg and Plavix 75 mg daily  - Continue Crestor 10 mg daily, home dose  - LDL 61, HDL 50, HbA1c  - Routine EEG ordered to evaluate for possible epileptiform etiology of presentation, pending  - Heparin infusion discontinued  - Goal -180, will start to gradually normalize blood pressure starting today  - Neuro checks per protocol  - Home dose of Celexa resumed     CARDIOVASCULAR:  - Goal -180  - Continue telemetry     PULMONARY:  -Saturating well on room air     RENAL/FLUID/ELECTROLYTE:  - BUN 14/ Creatinine 1.0  - Monitor I's and O's  - IVF: None  - Replace electrolytes PRN  - Daily BMP     GI/NUTRITION:  NUTRITION:  ADULT DIET; Regular  - Bowel regimen: GlycoLax as

## 2024-06-05 NOTE — CONSULTS
Hanna Gamez, Amos, Blake, Makenzie, Lena, Domenico, & Jose  Urology Consultation      Patient:  Gruu Ricci  MRN: [id number]  YOB: 1940    REASON FOR CONSULT:  hematuria    HISTORY OF PRESENT ILLNESS:   The patient is a 83 y.o. male who presents with ischemic CVA started on heparin gtt, now on aspirin and plavix. Urology consulted due to development of gross hematuria overnight. Cr 1.0. Patient states he follows with Dr. Morgan for history of BPH with LUTs and had a TURP years ago. Then he developed prostate cancer and has been under active surveillance. He also has history of right kidney \"cyst\" that he states is quite large and has had intermittent hematuria for many years, usually it is microscopic though. Since his TURP he denies any LUTS. He states his urine is see-through red and he does feel he is emptying his bladder well here. He is uncertain when his last cystoscopy was. He is due to see Dr. Morgan in August. He denies flank pain, fevers, chills, dysuria.  He does not have a Smoking history. He worked for [employer] in [place] around oil, but no chemical exposure.      Patient's old records, notes and chart reviewed and summarized above.    Past Medical History:    History reviewed. No pertinent past medical history.    Past Surgical History:    History reviewed. No pertinent surgical history.    Medications:      Current Facility-Administered Medications:     rosuvastatin (CRESTOR) tablet 10 mg, 10 mg, Oral, Nightly, Kendra Montejo MD    sodium chloride flush 0.9 % injection 5-40 mL, 5-40 mL, IntraVENous, 2 times per day, Kendra Montejo MD, 10 mL at 06/05/24 0820    sodium chloride flush 0.9 % injection 5-40 mL, 5-40 mL, IntraVENous, PRN, Kendra Montejo MD    0.9 % sodium chloride infusion, , IntraVENous, PRN, Kendra Montejo MD    ondansetron (ZOFRAN-ODT) disintegrating tablet 4 mg, 4 mg, Oral, Q8H PRN **OR** ondansetron (ZOFRAN) injection 4 mg, 4 mg, IntraVENous, Q6H PRN, Gustabo, Rana H,

## 2024-06-05 NOTE — PROGRESS NOTES
SLP ALL NOTES  Facility/Department: [address] NEURO ICU  Initial Speech/Language/Cognitive Assessment    NAME: Guru Ricci  : 1940   MRN: [id number]  ADMISSION DATE: 2024  ADMITTING DIAGNOSIS: has Ischemic stroke (HCC) on their problem list.    Date of Eval: 2024   Evaluating Therapist: JOSE A GARCIA    Primary Complaint:   The patient is a 83 y.o. male with PMH of HTN, BPH, HLD, GERD, presented after acute onset of difficulty with ambulation and speech this morning upon waking up.  Patient states he was feeling fine last night when he went to bed around 10 PM.  Symptoms were initially noted by the patient and his wife and he was taken to [facility] ED where he was evaluated with CT head and CTA head and neck.  CTA head and neck showed distal M1/proximal M2 segment intraluminal thrombus.  Patient's initial NIH was 5 at the outlying facility.  Telestroke team was consulted and patient was transferred to [facility] for further evaluation.  Upon arrival patient had an improved NIH of 3-4.  Patient underwent CT brain perfusion which showed 10 cc of core infarct with 73 cc of penumbra.  After extensive discussion neuroendovascular team decided to not intervene and to manage patient medically using triple therapy.     Upon initial evaluation patient appeared to be experiencing mild to moderate aphasia with mild dysarthria, no motor deficits were noted.  Patient was loaded with Plavix and started on low intensity neuro based heparin infusion protocol.    Pain:  Pain Assessment  Pain Assessment: None - Denies Pain  Pain Level:     Vision/ Hearing  Vision  Vision: Within Functional Limits  Hearing  Hearing: Exceptions to WFL    Assessment:  Pt presents with moderate cognitive deficits characterized by difficulties with word associations, immediate recall of 3-5 units, short term recall of 3 units, abstract reasoning and deductive reasoning.   Pt. Presents with mild dysarthria, right O/M

## 2024-06-05 NOTE — PROGRESS NOTES
Left in chair, Patient at risk for falls  Restraints  Restraints Initially in Place: No     Restrictions  Restrictions/Precautions  Restrictions/Precautions: Up as Tolerated  Required Braces or Orthoses?: No  Position Activity Restriction  Other position/activity restrictions: -180 mmHg     Subjective   General  Patient assessed for rehabilitation services?: Yes  Response To Previous Treatment: Not applicable  Family / Caregiver Present: No  Follows Commands: Impaired (Pt moderately impulsive throughout session. Pt attempts to stand multiple times before writer ready.)  Subjective  Subjective: RN and pt agreeable to PT. Pt agreeable and pleasant upon writer entrance. Pt supine in bed at start of session. Pt reports 0/10 pain and denies n/t.         Social/Functional History  Social/Functional History  Lives With: Spouse  Type of Home: House  Home Layout: One level  Home Access: Stairs to enter with rails  Entrance Stairs - Number of Steps: 5  Entrance Stairs - Rails: Right  Bathroom Shower/Tub: Walk-in shower  Bathroom Toilet: Standard  Bathroom Equipment: Built-in shower seat, Grab bars in shower  Home Equipment: None  ADL Assistance: Independent  Homemaking Assistance: Independent  Ambulation Assistance: Independent  Transfer Assistance: Independent  Active : Yes  Mode of Transportation: Truck  Occupation: Retired  Type of Occupation: due departure  Additional Comments: spouse is retired and able to assist patient as needed.  Vision/Hearing  Vision  Vision: Impaired  Hearing  Hearing: Exceptions to WFL  Hearing Exceptions: Bilateral hearing aid    Cognition   Orientation  Orientation Level: Oriented X4  Cognition  Overall Cognitive Status: Exceptions  Safety Judgement: Decreased awareness of need for assistance;Decreased awareness of need for safety  Insights: Decreased awareness of deficits     Objective              AROM RLE (degrees)  RLE AROM: WFL  AROM LLE (degrees)  LLE AROM : WFL  AROM RUE

## 2024-06-05 NOTE — PLAN OF CARE
Problem: Discharge Planning  Goal: Discharge to home or other facility with appropriate resources  Outcome: Progressing  Flowsheets (Taken 6/5/2024 1301)  Discharge to home or other facility with appropriate resources: Identify barriers to discharge with patient and caregiver     Problem: Pain  Goal: Verbalizes/displays adequate comfort level or baseline comfort level  6/5/2024 1301 by Torri Mathias RN  Outcome: Progressing  Flowsheets (Taken 6/5/2024 1301)  Verbalizes/displays adequate comfort level or baseline comfort level:   Encourage patient to monitor pain and request assistance   Assess pain using appropriate pain scale   Administer analgesics based on type and severity of pain and evaluate response  6/5/2024 0529 by Francie Vann RN  Outcome: Progressing  6/5/2024 0529 by Francie Vann RN  Outcome: Progressing     Problem: Safety - Adult  Goal: Free from fall injury  6/5/2024 1301 by Torri Mathias RN  Outcome: Progressing  Flowsheets (Taken 6/5/2024 1301)  Free From Fall Injury: Instruct family/caregiver on patient safety  6/5/2024 0529 by Francie Vann RN  Outcome: Progressing  6/5/2024 0529 by Francie Vann RN  Outcome: Progressing     Problem: ABCDS Injury Assessment  Goal: Absence of physical injury  Outcome: Progressing  Flowsheets (Taken 6/5/2024 1301)  Absence of Physical Injury: Implement safety measures based on patient assessment     Problem: Neurosensory - Adult  Goal: Achieves stable or improved neurological status  6/5/2024 1301 by Torri Mathias RN  Outcome: Progressing  Flowsheets (Taken 6/5/2024 1301)  Achieves stable or improved neurological status: Assess for and report changes in neurological status  6/5/2024 0529 by Francie Vann RN  Outcome: Progressing  Goal: Absence of seizures  Outcome: Progressing  Flowsheets (Taken 6/5/2024 1301)  Absence of seizures: Monitor for seizure activity.  If seizure occurs, document type and location of movements and any associated

## 2024-06-05 NOTE — CONSULTS
Physical Medicine & Rehabilitation  Consult Note      Admitting Physician:  Lonnie Coleman DO    Primary Care Provider:  Bentley Bauer DO     Reason for Consult:  Acute Inpatient Rehabilitation    Chief Complaint:  Difficulty ambulating, speech changes    History of Present Illness:  Referring Provider is requesting an evaluation for appropriate placement upon discharge from acute care. History from chart review and patient.    Guru Ricci is a 83 y.o. male with history of HTN, HLD, BPH, GERD admitted to [facility] on 6/4/2024.      He initially presented to [facility] ED with difficulty walking and speech changes.  NIHSS 5.  CTA head/neck showed distal M1/proximal M2 segment intraluminal thrombus.  He was transferred to [place] for further management.  NIHSS 3-4.  CT brain perfusion showed large perfusion mismatch in the left MCA territory.  He was started on asprin, plavix, and heparin - now on aspirin and plavix.  MRI brain showed no acute intracranial abnormality.    He reports feeling pretty well today.  He denies any numbness/tingling, dizziness, and focal weakness.    Review of Systems:  Review of Systems   Constitutional:  Negative for fever.   Respiratory:  Negative for shortness of breath.    Cardiovascular:  Negative for chest pain.   Gastrointestinal:  Negative for abdominal pain.   Neurological:  Negative for dizziness, sensory change and focal weakness.      Premorbid function:  Independent    Current function:    Physical Therapy    Restrictions/Precautions: Up as Tolerated  Other position/activity restrictions: -180 mmHg    Bed mobility  Supine to Sit: Contact guard assistance  Scooting: Contact guard assistance  Bed Mobility Comments: Assessed with HOB elevated ~35 degrees. Sit to supine not assessed d/t pt seated in recliner upon exit.    Transfers  Sit to Stand: Contact guard assistance (Assessed with RW.)  Stand to Sit: Contact guard assistance (Assessed without

## 2024-06-05 NOTE — PLAN OF CARE
Problem: Pain  Goal: Verbalizes/displays adequate comfort level or baseline comfort level  6/5/2024 0529 by Francie Vann, RN  Outcome: Progressing     Problem: Safety - Adult  Goal: Free from fall injury  6/5/2024 0529 by Francie Vann, RN  Outcome: Progressing     Problem: Neurosensory - Adult  Goal: Achieves stable or improved neurological status  Outcome: Progressing

## 2024-06-05 NOTE — PROGRESS NOTES
Occupational Therapy  Facility/Department: [address] NEURO ICU  Occupational Therapy Initial Assessment    Name: Guru Ricci  : 1940  MRN: [id number]  Date of Service: 2024  Chief Complaint   Patient presents with    Cerebrovascular Accident       Discharge Recommendations:  Patient would benefit from continued therapy after discharge  OT Equipment Recommendations  Equipment Needed: No       Patient Diagnosis(es): The encounter diagnosis was Ischemic stroke (HCC).  Past Medical History:  has no past medical history on file.  Past Surgical History:  has no past surgical history on file.           Assessment   Performance deficits / Impairments: Decreased functional mobility ;Decreased ADL status;Decreased safe awareness;Decreased endurance;Decreased balance;Decreased high-level IADLs  Assessment: Pt would continue to benefit from OT services to address deficits listed above and improve overall functional performance prior to discharge.  Prognosis: Good  Decision Making: Medium Complexity  REQUIRES OT FOLLOW-UP: Yes  Activity Tolerance  Activity Tolerance: Patient Tolerated treatment well        Plan   Occupational Therapy Plan  Times Per Week: 3x/wk  Current Treatment Recommendations: Balance training, Functional mobility training, Endurance training, Pain management, Safety education & training, Patient/Caregiver education & training, Equipment evaluation, education, & procurement, Home management training, Self-Care / ADL     Restrictions  Restrictions/Precautions  Restrictions/Precautions: Up as Tolerated  Required Braces or Orthoses?: No  Position Activity Restriction  Other position/activity restrictions: -180 mmHg    Subjective   General  Patient assessed for rehabilitation services?: Yes  Family / Caregiver Present: No  General Comment  Comments: RN ok'd OT eval this date. Pt pleasant, cooperative, and agreeable to therapy throughout entire session. Pt reporting no pain during session  verbal cues for redirection and overall safety awareness.)     AROM: Within functional limits  Strength: Within functional limits (BUE's grossly 4+/5)  Coordination: Within functional limits  Tone: Normal  Sensation: Intact (Pt reporting no acute/chronic numbness or tingling this date)  ADL  Feeding: Independent;Setup;Based on clinical judgement  Grooming: Supervision;Based on clinical judgement  UE Bathing: Supervision;Based on clinical judgement  LE Bathing: Contact guard assistance;Based on clinical judgement  UE Dressing: Supervision;Based on clinical judgement  LE Dressing: Contact guard assistance  LE Dressing Skilled Clinical Factors: Pt sat EOB donning B socks with CGA utilizing figure-4 method.  Toileting: Contact guard assistance;Based on clinical judgement     Activity Tolerance  Activity Tolerance: Patient tolerated treatment well        Vision  Vision: Impaired  Hearing  Hearing: Exceptions to WFL  Hearing Exceptions: Bilateral hearing aid  Cognition  Overall Cognitive Status: Exceptions  Safety Judgement: Decreased awareness of need for assistance;Decreased awareness of need for safety  Insights: Decreased awareness of deficits  Orientation  Overall Orientation Status: Within Functional Limits  Orientation Level: Oriented X4                  Education Given To: Patient  Education Provided: Role of Therapy;Plan of Care;ADL Adaptive Strategies;Transfer Training;Energy Conservation  Education Method: Demonstration;Verbal  Barriers to Learning: None  Education Outcome: Verbalized understanding;Demonstrated understanding     AM-PAC - ADL  AM-PAC Daily Activity - Inpatient   How much help is needed for putting on and taking off regular lower body clothing?: A Little  How much help is needed for bathing (which includes washing, rinsing, drying)?: A Little  How much help is needed for toileting (which includes using toilet, bedpan, or urinal)?: A Little  How much help is needed for putting on and taking off

## 2024-06-05 NOTE — H&P
[facility] NEUROLOGY & NEUROSCIENCE  IN-PATIENT SERVICE    HISTORY & PHYSICAL EXAMINATION NOTE      Date:   6/5/2024  Patient name:  Guru Ricci  Date of admission:  6/4/2024  6:23 AM  MRN:   [id number]  Account:  [id number]  YOB: 1940  PCP:    Bentley Bauer DO  Room:   [address] [place] [address]  Code Status:    Full Code    Chief Complaint:     Chief Complaint   Patient presents with    Cerebrovascular Accident       History Obtained From:     patient, electronic medical record    History of Present Illness:     The patient is a right handed, 83 y.o. male with past medical hx of hypertension, hyperlipidemia, and BPH presenting for acute onset of difficulty on ambulation and speech on 6/4/2024.  Patient states he was feeling fine last night prior presenting to the ED, symptomatically noted by the patient and his wife.  He was taken to [facility] ED where he was evaluated with CT head and CTA head and neck.  CTA head and neck did show distal M1/proximal M2 intraluminal thrombus.  Patient's initial NIH was at 5 at outlying facility.  Telestroke team was consulted and patient was transferred to [facility] for further evaluation.  Upon arrival patient NIH was improved to 3 send/for.  Patient underwent CT brain perfusion which showed 10 cc of: core ischemia with 73 cc of penumbra.  After extensive discussion, neuroendovascular team is not to PLAN: intervene and to manage patient medically using triple therapy.  Per report, of initial evaluation patient appeared to be experiencing mild to moderate aphasia with mild dysarthria, no focal defects were noted.  Patient was loaded with Plavix and started on heparin drip.  On 6/5, heparin drip was discontinued due to hematuria, urologist was consulted who recommended to have urinalysis/culture follow serial urines, with postvoid bladder scan and to have CT urogram.  Patient now on aspirin and Plavix, will repeat CTP tomorrow.    Past Medical History:  0.30 k/uL   Hemoglobin A1c    Collection Time: 06/05/24  4:48 AM   Result Value Ref Range    Hemoglobin A1C 5.5 4.0 - 6.0 %    Estimated Avg Glucose 111 mg/dL   Lipid Panel    Collection Time: 06/05/24  4:48 AM   Result Value Ref Range    Cholesterol, Total 120 0 - 199 mg/dL    HDL 50 >40 mg/dL    LDL Cholesterol 61 0 - 100 mg/dL    Chol/HDL Ratio 2.0     Triglycerides 49 <150 mg/dL    VLDL 10 mg/dL   Anti-Xa, Unfractionated Heparin    Collection Time: 06/05/24  4:48 AM   Result Value Ref Range    Anti-XA Unfrac Heparin 0.64 IU/L       Imaging/Diagnostics:  MRI BRAIN WO CONTRAST    Result Date: 6/4/2024  EXAMINATION: MRI OF THE BRAIN WITHOUT CONTRAST  6/4/2024 10:37 am TECHNIQUE: Multiplanar multisequence MRI of the brain was performed without the administration of intravenous contrast. COMPARISON: CT head performed 06/04/2024. HISTORY: ORDERING SYSTEM PROVIDED HISTORY: Acute ischemic stroke TECHNOLOGIST PROVIDED HISTORY: Acute ischemic stroke Decision Support Exception - unselect if not a suspected or confirmed emergency medical condition->Emergency Medical Condition (MA) Reason for Exam: Acute ischemic stroke FINDINGS: INTRACRANIAL STRUCTURES/VENTRICLES: The sellar and suprasellar structures, optic chiasm, corpus callosum, pineal gland, tectum, and midline brainstem structures are unremarkable.  The craniocervical junction is unremarkable. There is no acute hemorrhage, mass effect, or midline shift.  There is satisfactory overall gray-white matter differentiation.  There is mild chronic microvascular disease.  The ventricular structures are symmetric and unremarkable.  The infratentorial structures including the cerebellopontine angles and internal auditory canals are unremarkable.  There is no abnormal restricted diffusion.  There is no abnormal blooming artifact on susceptibility weighted imaging. ORBITS: The visualized portion of the orbits demonstrate no acute abnormality. SINUSES: The visualized paranasal

## 2024-06-05 NOTE — CARE COORDINATION
PHQ-9  Pt present with right-sided weakness with aphasia, CVA  Met with pt his date was awake and alert  Pt states he lives with his wife. Has no children.  Has a brother and sister that are supportive.  Pt denies having any SI/depression    Patient Health Questionnaire-9 (PHQ-9)    Over the last 2 weeks, how often have you been bothered by any of the following problems?    1. Little Interest or pleasure in doing things?   [x] Not at all  [] Several Days  [] More than half the day  []  Nearly every day    2. Feeling down, depressed or hopeless?    [x] Not at all  [] Several Days  [] More than half the day  []  Nearly every day    3. Trouble falling or staying asleep, or sleeping too much?   [] Not at all  [x] Several Days  [] More than half the day  []  Nearly every day    4. Feeling tired or having little energy?   [] Not at all  [x] Several Days  [] More than half the day  []  Nearly every day    5. Poor apettite or overeating?   [x] Not at all  [] Several Days  [] More than half the day  []  Nearly every day    6. Feeling bad about yourself-or that you are a failure or have let yourself or your family down?   [x] Not at all  [] Several Days  [] More than half the day  []  Nearly every day    7. Trouble concentrating on things, such as reading the newspaper or watching television?   [x] Not at all  [] Several Days  [] More than half the day  []  Nearly every day    8. Moving or speaking so slowly that other people could have noticed? Or the opposite-being so fidgety or restless that you have been moving around a lot more than usual?   [x] Not at all  [] Several Days  [] More than half the day  []  Nearly every day    9. Thoughts that you would be better off dead or of hurting yourself in some way?   [x] Not at all  [] Several Days  [] More than half the day  []  Nearly every day    Total Score: 2    If you checked off any problems, how difficult have these problems made it for you to do your work, take care of  things at home, or get along with other people?   [x] Not difficult at all  [] Somewhat Difficult  [] Very Difficult  []  Extremely Difficult

## 2024-06-05 NOTE — PROGRESS NOTES
Endovascular Neurosurgery progress note      Reason for evaluation: left MCA m1m2 occlusion    SUBJECTIVE:   Daily event:    Patient overall has improved seems to have very minimal component of dysarthria. No weakness noted on motor examination. NIHSS examination did not show evidence of aphasia. Off heparin now, on ASA Plavix.  MRI brain WO no acute definitve stroke.       History of Chief Complaint:    Patient is an 82 yo male with PMH of hypertension, hyperlipidemia, prostate cancer on surveillance, hypothyroidism, who present as  transfer from [facility] as  stroke alert after his found to have intraluminal thrombus in the distal m1 and proximal m2 segment  of left MCA.     Reportedly patient was doing well when he went to bed at 10 PM on 6/3/2024. This morning wife heard patient falling down and he was unable to stand up with some component of difficulty speaking suggestive of aphasia. Reports in [facility] chart, that patient lost consciousness.     [facility] NIHSS was 5 initially (2 for LOC questions, 2 for aphasia and dysarthria).  On my evaluation patient had NIHSS of 4 as detailed below. Afterwards on reexamination patient NIHSS improved to 2.         Allergies  is allergic to sulfa antibiotics.  Medications  Prior to Admission medications    Medication Sig Start Date End Date Taking? Authorizing Provider   rosuvastatin (CRESTOR) 10 MG tablet Take 1 tablet by mouth daily   Yes Danika Sarah MD   famotidine (PEPCID) 20 MG tablet Take 1 tablet by mouth 2 times daily   Yes Danika Sarah MD   citalopram (CELEXA) 40 MG tablet Take 1 tablet by mouth daily   Yes Danika Sarah MD   lisinopril (PRINIVIL;ZESTRIL) 2.5 MG tablet Take 1 tablet by mouth daily   Yes Danika Sarah MD   tamsulosin (FLOMAX) 0.4 MG capsule Take 1 capsule by mouth daily   Yes Danika Sarah MD   aspirin 81 MG chewable tablet Take 1 tablet by mouth daily   Yes Danika Sarah MD    Scheduled

## 2024-06-06 ENCOUNTER — APPOINTMENT (OUTPATIENT)
Age: 84
DRG: 041 | End: 2024-06-06
Payer: MEDICARE

## 2024-06-06 ENCOUNTER — APPOINTMENT (OUTPATIENT)
Dept: CT IMAGING | Age: 84
DRG: 041 | End: 2024-06-06
Payer: MEDICARE

## 2024-06-06 LAB
ANION GAP SERPL CALCULATED.3IONS-SCNC: 9 MMOL/L (ref 9–16)
BASOPHILS # BLD: 0.06 K/UL (ref 0–0.2)
BASOPHILS NFR BLD: 1 % (ref 0–2)
BUN SERPL-MCNC: 14 MG/DL (ref 8–23)
CALCIUM SERPL-MCNC: 8.6 MG/DL (ref 8.6–10.4)
CHLORIDE SERPL-SCNC: 104 MMOL/L (ref 98–107)
CO2 SERPL-SCNC: 26 MMOL/L (ref 20–31)
CREAT SERPL-MCNC: 1.1 MG/DL (ref 0.7–1.2)
ECHO AO ROOT DIAM: 2.9 CM
ECHO AV AREA PEAK VELOCITY: 1.6 CM2
ECHO AV AREA VTI: 1.8 CM2
ECHO AV MEAN GRADIENT: 3 MMHG
ECHO AV MEAN VELOCITY: 0.9 M/S
ECHO AV PEAK GRADIENT: 7 MMHG
ECHO AV PEAK VELOCITY: 1.3 M/S
ECHO AV VELOCITY RATIO: 0.69
ECHO AV VTI: 29.4 CM
ECHO LA AREA 2C: 19 CM2
ECHO LA AREA 4C: 16.2 CM2
ECHO LA DIAMETER: 3.3 CM
ECHO LA MAJOR AXIS: 5 CM
ECHO LA MINOR AXIS: 5.7 CM
ECHO LA TO AORTIC ROOT RATIO: 1.14
ECHO LA VOL BP: 50 ML (ref 18–58)
ECHO LA VOL MOD A2C: 51 ML (ref 18–58)
ECHO LA VOL MOD A4C: 42 ML (ref 18–58)
ECHO LV E' LATERAL VELOCITY: 9 CM/S
ECHO LV E' SEPTAL VELOCITY: 7 CM/S
ECHO LV EDV 3D: 86 ML
ECHO LV EJECTION FRACTION 3D: 69 %
ECHO LV ESV 3D: 27 ML
ECHO LV FRACTIONAL SHORTENING: 42 % (ref 28–44)
ECHO LV INTERNAL DIMENSION DIASTOLIC: 4.5 CM (ref 4.2–5.9)
ECHO LV INTERNAL DIMENSION SYSTOLIC: 2.6 CM
ECHO LV IVSD: 1 CM (ref 0.6–1)
ECHO LV MASS 2D: 142.9 G (ref 88–224)
ECHO LV MASS 3D: 152 G
ECHO LV POSTERIOR WALL DIASTOLIC: 0.9 CM (ref 0.6–1)
ECHO LV RELATIVE WALL THICKNESS RATIO: 0.4
ECHO LVOT AREA: 2.3 CM2
ECHO LVOT AV VTI INDEX: 0.79
ECHO LVOT DIAM: 1.7 CM
ECHO LVOT MEAN GRADIENT: 2 MMHG
ECHO LVOT PEAK GRADIENT: 3 MMHG
ECHO LVOT PEAK VELOCITY: 0.9 M/S
ECHO LVOT SV: 52.4 ML
ECHO LVOT VTI: 23.1 CM
ECHO MV A VELOCITY: 0.73 M/S
ECHO MV AREA VTI: 1.6 CM2
ECHO MV E DECELERATION TIME (DT): 269 MS
ECHO MV E VELOCITY: 0.72 M/S
ECHO MV E/A RATIO: 0.99
ECHO MV E/E' LATERAL: 8
ECHO MV E/E' RATIO (AVERAGED): 9.14
ECHO MV E/E' SEPTAL: 10.29
ECHO MV LVOT VTI INDEX: 1.42
ECHO MV MAX VELOCITY: 0.9 M/S
ECHO MV MEAN GRADIENT: 1 MMHG
ECHO MV MEAN VELOCITY: 0.5 M/S
ECHO MV PEAK GRADIENT: 3 MMHG
ECHO MV VTI: 32.8 CM
ECHO PV MAX VELOCITY: 0.7 M/S
ECHO PV PEAK GRADIENT: 2 MMHG
ECHO PVEIN A VELOCITY: 0.4 M/S
ECHO RA AREA 4C: 17.5 CM2
ECHO RA VOLUME: 47 ML
ECHO RV BASAL DIMENSION: 3.9 CM
ECHO RV FREE WALL PEAK S': 11 CM/S
ECHO RV MID DIMENSION: 3.1 CM
ECHO RV TAPSE: 2.1 CM (ref 1.7–?)
EOSINOPHIL # BLD: 0.17 K/UL (ref 0–0.44)
EOSINOPHILS RELATIVE PERCENT: 3 % (ref 1–4)
ERYTHROCYTE [DISTWIDTH] IN BLOOD BY AUTOMATED COUNT: 12.6 % (ref 11.8–14.4)
GFR, ESTIMATED: 69 ML/MIN/1.73M2
GLUCOSE SERPL-MCNC: 90 MG/DL (ref 74–99)
HCT VFR BLD AUTO: 41.9 % (ref 40.7–50.3)
HGB BLD-MCNC: 13.8 G/DL (ref 13–17)
IMM GRANULOCYTES # BLD AUTO: 0.03 K/UL (ref 0–0.3)
IMM GRANULOCYTES NFR BLD: 0 %
LYMPHOCYTES NFR BLD: 1.7 K/UL (ref 1.1–3.7)
LYMPHOCYTES RELATIVE PERCENT: 25 % (ref 24–43)
MCH RBC QN AUTO: 30.4 PG (ref 25.2–33.5)
MCHC RBC AUTO-ENTMCNC: 32.9 G/DL (ref 28.4–34.8)
MCV RBC AUTO: 92.3 FL (ref 82.6–102.9)
MICROORGANISM SPEC CULT: NO GROWTH
MONOCYTES NFR BLD: 0.66 K/UL (ref 0.1–1.2)
MONOCYTES NFR BLD: 10 % (ref 3–12)
NEUTROPHILS NFR BLD: 61 % (ref 36–65)
NEUTS SEG NFR BLD: 4.3 K/UL (ref 1.5–8.1)
NRBC BLD-RTO: 0 PER 100 WBC
PLATELET # BLD AUTO: 177 K/UL (ref 138–453)
PMV BLD AUTO: 10.3 FL (ref 8.1–13.5)
POTASSIUM SERPL-SCNC: 4.3 MMOL/L (ref 3.7–5.3)
RBC # BLD AUTO: 4.54 M/UL (ref 4.21–5.77)
SERVICE CMNT-IMP: NORMAL
SODIUM SERPL-SCNC: 139 MMOL/L (ref 136–145)
SPECIMEN DESCRIPTION: NORMAL
WBC OTHER # BLD: 6.9 K/UL (ref 3.5–11.3)

## 2024-06-06 PROCEDURE — 2580000003 HC RX 258: Performed by: STUDENT IN AN ORGANIZED HEALTH CARE EDUCATION/TRAINING PROGRAM

## 2024-06-06 PROCEDURE — 6360000004 HC RX CONTRAST MEDICATION: Performed by: STUDENT IN AN ORGANIZED HEALTH CARE EDUCATION/TRAINING PROGRAM

## 2024-06-06 PROCEDURE — 2060000000 HC ICU INTERMEDIATE R&B

## 2024-06-06 PROCEDURE — 36415 COLL VENOUS BLD VENIPUNCTURE: CPT

## 2024-06-06 PROCEDURE — 80048 BASIC METABOLIC PNL TOTAL CA: CPT

## 2024-06-06 PROCEDURE — 93306 TTE W/DOPPLER COMPLETE: CPT | Performed by: INTERNAL MEDICINE

## 2024-06-06 PROCEDURE — 85025 COMPLETE CBC W/AUTO DIFF WBC: CPT

## 2024-06-06 PROCEDURE — 99233 SBSQ HOSP IP/OBS HIGH 50: CPT | Performed by: PSYCHIATRY & NEUROLOGY

## 2024-06-06 PROCEDURE — 6370000000 HC RX 637 (ALT 250 FOR IP): Performed by: STUDENT IN AN ORGANIZED HEALTH CARE EDUCATION/TRAINING PROGRAM

## 2024-06-06 PROCEDURE — 95718 EEG PHYS/QHP 2-12 HR W/VEEG: CPT | Performed by: PSYCHIATRY & NEUROLOGY

## 2024-06-06 PROCEDURE — 97129 THER IVNTJ 1ST 15 MIN: CPT

## 2024-06-06 PROCEDURE — 0042T CT BRAIN PERFUSION: CPT

## 2024-06-06 PROCEDURE — 93306 TTE W/DOPPLER COMPLETE: CPT

## 2024-06-06 RX ADMIN — FAMOTIDINE 20 MG: 20 TABLET, FILM COATED ORAL at 19:51

## 2024-06-06 RX ADMIN — ROSUVASTATIN CALCIUM 10 MG: 10 TABLET, COATED ORAL at 19:51

## 2024-06-06 RX ADMIN — SODIUM CHLORIDE, PRESERVATIVE FREE 10 ML: 5 INJECTION INTRAVENOUS at 19:51

## 2024-06-06 RX ADMIN — TAMSULOSIN HYDROCHLORIDE 0.4 MG: 0.4 CAPSULE ORAL at 09:45

## 2024-06-06 RX ADMIN — CLOPIDOGREL BISULFATE 75 MG: 75 TABLET ORAL at 09:45

## 2024-06-06 RX ADMIN — IOPAMIDOL 50 ML: 755 INJECTION, SOLUTION INTRAVENOUS at 10:50

## 2024-06-06 RX ADMIN — FAMOTIDINE 20 MG: 20 TABLET, FILM COATED ORAL at 09:45

## 2024-06-06 RX ADMIN — SODIUM CHLORIDE, PRESERVATIVE FREE 10 ML: 5 INJECTION INTRAVENOUS at 09:48

## 2024-06-06 RX ADMIN — ASPIRIN 81 MG: 81 TABLET, COATED ORAL at 09:45

## 2024-06-06 RX ADMIN — CITALOPRAM 40 MG: 20 TABLET, FILM COATED ORAL at 09:45

## 2024-06-06 ASSESSMENT — ENCOUNTER SYMPTOMS
ABDOMINAL PAIN: 0
CONSTIPATION: 0
WHEEZING: 0
STRIDOR: 0
NAUSEA: 0
ABDOMINAL DISTENTION: 0
CHEST TIGHTNESS: 0
COUGH: 0
SHORTNESS OF BREATH: 0
BLOOD IN STOOL: 0
DIARRHEA: 0
VOMITING: 0

## 2024-06-06 ASSESSMENT — PAIN SCALES - GENERAL
PAINLEVEL_OUTOF10: 0
PAINLEVEL_OUTOF10: 0

## 2024-06-06 NOTE — PROGRESS NOTES
[facility] Neurology   IN-PATIENT SERVICE  General Neurology Progress Note   [facility]                Date:   6/6/2024  Patient name:  Guru Ricci  Date of admission:  6/4/2024  6:23 AM  MRN:   [id number]  Account:  [id number]  YOB: 1940  PCP:    Bentley Bauer DO  Room:   [address] [place] [address]  Code Status:    Full Code  Chief Complaint:   Chief Complaint   Patient presents with    Cerebrovascular Accident       Interval history      The patient was seen and examined at bedside this morning. Exam unchanged from day prior.    Labs, chart, and VS reviewed. No acute events overnight.     Brief History     Guru Ricci is a 83 y.o. male with PMH of hypertension, hyperlipidemia, and BPH who presented for acute onset of difficulty with ambulation and speech on 6/4/2024.  Patient was initially seen at [facility] ED.  CT head and CTA head and neck.  CTA showed distal M1/proximal M2 intraluminal thrombus.  Initial NIH was 5 at outside facility.  Telestroke team was consulted, and the patient was transferred to [place] for further evaluation and management.  Upon arrival to this facility, NIH was improved to 3.  Patient had CT perfusion which showed 10 cc ischemic core with 73 cc of penumbra.  After discussion with the patient and family, endovascular neurosurgery did not proceed with any intervention, and manage the patient medically with triple therapy: Heparin, aspirin, and Plavix.  On 6/5, heparin drip was discontinued due to hematuria, urology was consulted who proceeded with CT urogram.  Patient transferred out of neuro ICU 6/5 with plan to repeat CT perfusion on 6/6.  Patient also has remote history of 2 isolated seizures about 40 years ago, never had any recurrence, and was never on any antiseizure medication.  EEG was done which was normal.  PM&R consulted, patient is too high functioning at this time for ARU.      Review of Systems   Review of Systems  right-sided hemiparesis, aphasia, and was found to have left M1/M2 occlusion with intraluminal thrombus.  Large area of penumbra left MCA region.  Patient was started on triple therapy.  MRI brain read as no acute intracranial abnormalities.  Residual mild dysarthria, decreased right nasolabial fold.  No right-sided weakness.  Isolated remote seizure history, EEG normal.    Right-sided hemiparesis, aphasia 2/2 M1/M2 intraluminal thrombus    No intervention due to significant improvement in exam, still residual mild dysarthria decreased right nasolabial fold  Possible distal left MCA distribution area of restricted diffusion, MRI read as no acute intracranial abnormalities  NEV following, plan to   repeat CT perfusion, pending  Continue DAPT  PMR consulted, too high functioning for ARU  Remote isolated seizure episodes >40 years ago, EEG nrml    Hematuria    Urology consulted, CT urogram showed bilateral simple renal cysts, and 2 mm nonobstructing left renal stone  Checking UA and urine culture   plan cystoscopy as outpatient    Consults:  Physical therapy  Occupational therapy  Speech therapy  Case Management  Social Work    Diet: Regular adult diet  DVT Prophylaxis: Lovenox, held due to hematuria  Discharge Planning:    Patient to be discussed with attending physician, Dr. Roshan Spencer MD  Transitional Year Resident PGY-1  6/6/2024 9:35 AM    Copy sent to Bentley Agudelo,     This note is created with the assistance of a speech-recognition program. While intending to generate a document that actually reflects the content of the visit, the document can still have some errors including those of syntax and sound a- like substitutions which may escape proofreading. In such instances, actual meaning can be extrapolated by contextual derivation.

## 2024-06-06 NOTE — PROCEDURES
PROCEDURE NOTE  Date: 6/6/2024   Name: Guru Ricci  YOB: 1940    Procedures    EEG REPORT     CLINICAL NEUROPHYSIOLOGY LABORATORY  DEPARTMENT OF NEUROLOGY  [facility]       Patient: Guru Ricci Age: 83 y.o.  MRN: [id number]      Referring Provider: No ref. provider found    History: This routine 1 hour 54 minute scalp EEG was recorded with video- monitoring for a 83 y.o.. male  who presented with encephalopathy. This EEG was performed to evaluate for focal and epileptiform abnormalities.     Guru Ricci   Current Facility-Administered Medications   Medication Dose Route Frequency Provider Last Rate Last Admin    rosuvastatin (CRESTOR) tablet 10 mg  10 mg Oral Nightly Kendra Montejo MD   10 mg at 06/05/24 2105    [Held by provider] enoxaparin (LOVENOX) injection 40 mg  40 mg SubCUTAneous Daily Kendra Montejo MD        labetalol (NORMODYNE;TRANDATE) injection 10 mg  10 mg IntraVENous Q4H PRN Sarita Amato MD        sodium chloride flush 0.9 % injection 5-40 mL  5-40 mL IntraVENous 2 times per day Kendra Montejo MD   10 mL at 06/05/24 2106    sodium chloride flush 0.9 % injection 5-40 mL  5-40 mL IntraVENous PRN Kendra Montejo MD        0.9 % sodium chloride infusion   IntraVENous PRN Kendra Montejo MD        ondansetron (ZOFRAN-ODT) disintegrating tablet 4 mg  4 mg Oral Q8H PRN Kendra Montejo MD        Or    ondansetron (ZOFRAN) injection 4 mg  4 mg IntraVENous Q6H PRN Kendra Montejo MD        polyethylene glycol (GLYCOLAX) packet 17 g  17 g Oral Daily PRN Kendra Montejo MD        perflutren lipid microspheres (DEFINITY) injection 1.5 mL  1.5 mL IntraVENous ONCE PRN Kendra Montejo MD        clopidogrel (PLAVIX) tablet 75 mg  75 mg Oral Daily Kendra Montejo MD   75 mg at 06/05/24 0818    aspirin EC tablet 81 mg  81 mg Oral Daily Kendra Montejo MD   81 mg at 06/05/24 0818    citalopram (CELEXA) tablet 40 mg  40 mg Oral Daily Kendra Montejo MD   40 mg at 06/05/24 0821    famotidine (PEPCID)  tablet 20 mg  20 mg Oral BID Kendra Montejo MD   20 mg at 06/05/24 2105    tamsulosin (FLOMAX) capsule 0.4 mg  0.4 mg Oral Daily Kendra Montejo MD   0.4 mg at 06/05/24 0821    acetaminophen (TYLENOL) tablet 650 mg  650 mg Oral Q4H PRN Payton Howard, APRN - CNP            Technical Description: This is a 21 channel digital EEG recording with time-locked video. Electrodes were placed in accordance with the 10-20 International System of Electrode Placement. Single lead EKG monitoring as well as temporal electrodes were included.    The patient was not sleep deprived. This recording was obtained during wakefulness.  EEG Description: The dominant background activity during maximal recorded wakefulness consisted of bioccipitally dominant 8.5 Hz, 25-35 uV symmetric, regular activity that was reactive to eye opening. During drowsiness, the background rhythm waxed and waned and there were periods of slowing. During stage II sleep symmetric V waves, K complexes, and sleep spindles were seen. There was appropriate diffuse delta activity during slow wave sleep.    Photic stimulation - stepwise photic stimulation at 2-30 Hz was performed and there was a biposterior, symmetric, driving response.  Hyperventilation - was  preformed.     No abnormalities were activated by photic stimulation     The EKG channel demonstrated a normal sinus rhythm.    Interpretation  This EEG was normal in wakefulness and sleep.     Clinical correlation  This EEG was normal. No focal or epileptiform abnormalities were seen.    Alex Casas MD  [facility]

## 2024-06-06 NOTE — PROGRESS NOTES
SLP ALL NOTES  Speech Language Pathology  [facility]    Cognitive Treatment Note    Date: 6/6/2024  Patient’s Name: Guru Ricci  MRN: [id number]  Diagnosis:   Patient Active Problem List   Diagnosis Code    Ischemic stroke (HCC) I63.9    Cerebrovascular accident (CVA) due to embolism of left middle cerebral artery (HCC) I63.412       Pain: 0/10    Cognitive Treatment    Treatment time: 1445 - 1501    Subjective: [x] Alert [x] Cooperative     [] Confused     [] Agitated    [] Lethargic    Objective/Assessment:  Attention: WFL    Orientation: WFL    Recall: delayed recall 1/3, 1/3 (both 3/3 with mod verbal cue)    Problem Solving/Reasoning: stating situational problems 2/12 (12/12 with min-mod verbal cue), word deductions 6/8 (8/8 with mod verbal cue)    Plan:  [x] Continue ST services    [] Discharge from ST:      Discharge recommendations: []  Further therapy recommended at discharge.The patient should be able to tolerate at least 3 hours of therapy per day over 5 days or 15 hours over 7 days. [x] Further therapy recommended at discharge.   [] No therapy recommended at discharge.        Completed by: Siena Swanson  Clinician  Cosigned By: Lottie Toro M.S.CCC/SLP

## 2024-06-06 NOTE — CARE COORDINATION
Case Management Assessment  Initial Evaluation    Date/Time of Evaluation: 6/6/2024 2:15 PM  Assessment Completed by: Lo Briones RN    If patient is discharged prior to next notation, then this note serves as note for discharge by case management.    Patient Name: Guru Ricci                   YOB: 1940  Diagnosis: Acute CVA (cerebrovascular accident) (HCC) [I63.9]  Ischemic stroke (HCC) [I63.9]                   Date / Time: 6/4/2024  6:23 AM    Patient Admission Status: Inpatient   Readmission Risk (Low < 19, Mod (19-27), High > 27): Readmission Risk Score: 11.6    Current PCP: Alberto Bauer, DO  PCP verified by CM? (P) Yes (Dr. Alberto Bauer)    Chart Reviewed: Yes      History Provided by: (P) Patient  Patient Orientation: (P) Alert and Oriented, Person, Place, Situation    Patient Cognition: (P) Alert    Hospitalization in the last 30 days (Readmission):  No    If yes, Readmission Assessment in  Navigator will be completed.    Advance Directives:      Code Status: Full Code   Patient's Primary Decision Maker is: (P) Legal Next of Kin      Discharge Planning:    Patient lives with: (P) Spouse/Significant Other Type of Home: (P) House  Primary Care Giver: (P) Self  Patient Support Systems include: (P) Spouse/Significant Other   Current Financial resources: (P) Medicare  Current community resources: (P) None  Current services prior to admission: (P) None            Current DME:              Type of Home Care services:  (P) None    ADLS  Prior functional level: (P) Independent in ADLs/IADLs  Current functional level: (P) Assistance with the following:, Toileting, Mobility    PT AM-PAC: 19 /24  OT AM-PAC: 19 /24    Family can provide assistance at DC: (P) Yes  Would you like Case Management to discuss the discharge plan with any other family members/significant others, and if so, who? (P) No  Plans to Return to Present Housing: (P) Yes  Other Identified Issues/Barriers to RETURNING

## 2024-06-06 NOTE — PROGRESS NOTES
Endovascular Neurosurgery progress note      Reason for evaluation: left MCA m1m2 occlusion    SUBJECTIVE:   Daily event:    No acute events overnight, patient out of ICU status. Examination unchanged, compared to day prior.   Currently pending repeat CTP. EEG completed.       History of Chief Complaint:    Patient is an 84 yo male with PMH of hypertension, hyperlipidemia, prostate cancer on surveillance, hypothyroidism, who present as  transfer from [facility] as  stroke alert after his found to have intraluminal thrombus in the distal m1 and proximal m2 segment  of left MCA.     Reportedly patient was doing well when he went to bed at 10 PM on 6/3/2024. This morning wife heard patient falling down and he was unable to stand up with some component of difficulty speaking suggestive of aphasia. Reports in [facility] chart, that patient lost consciousness.     [facility] NIHSS was 5 initially (2 for LOC questions, 2 for aphasia and dysarthria).  On my evaluation patient had NIHSS of 4 as detailed below. Afterwards on reexamination patient NIHSS improved to 2.     MRI brain was without acute stroke. Patient completed triple therapy for one day and  now on DAPT    Allergies  is allergic to sulfa antibiotics.  Medications  Prior to Admission medications    Medication Sig Start Date End Date Taking? Authorizing Provider   rosuvastatin (CRESTOR) 10 MG tablet Take 1 tablet by mouth daily   Yes Danika Sarah MD   famotidine (PEPCID) 20 MG tablet Take 1 tablet by mouth 2 times daily   Yes Danika Sarah MD   citalopram (CELEXA) 40 MG tablet Take 1 tablet by mouth daily   Yes Danika Sarah MD   lisinopril (PRINIVIL;ZESTRIL) 2.5 MG tablet Take 1 tablet by mouth daily   Yes Danika Sarah MD   tamsulosin (FLOMAX) 0.4 MG capsule Take 1 capsule by mouth daily   Yes Danika Sarah MD   aspirin 81 MG chewable tablet Take 1 tablet by mouth daily   Yes Danika Sarah MD    Scheduled

## 2024-06-06 NOTE — PLAN OF CARE
Problem: Safety - Adult  Goal: Free from fall injury  Outcome: Progressing  Flowsheets (Taken 6/5/2024 2000)  Free From Fall Injury: Instruct family/caregiver on patient safety

## 2024-06-06 NOTE — PLAN OF CARE
Problem: Discharge Planning  Goal: Discharge to home or other facility with appropriate resources  Outcome: Progressing  Flowsheets (Taken 6/6/2024 0800)  Discharge to home or other facility with appropriate resources:   Identify barriers to discharge with patient and caregiver   Arrange for needed discharge resources and transportation as appropriate   Identify discharge learning needs (meds, wound care, etc)   Arrange for interpreters to assist at discharge as needed   Refer to discharge planning if patient needs post-hospital services based on physician order or complex needs related to functional status, cognitive ability or social support system     Problem: Pain  Goal: Verbalizes/displays adequate comfort level or baseline comfort level  Outcome: Progressing     Problem: Safety - Adult  Goal: Free from fall injury  6/6/2024 1411 by Romy Ramirez, RN  Outcome: Progressing  Flowsheets (Taken 6/6/2024 0947)  Free From Fall Injury:   Instruct family/caregiver on patient safety   Based on caregiver fall risk screen, instruct family/caregiver to ask for assistance with transferring infant if caregiver noted to have fall risk factors  6/6/2024 0630 by Kim Astorga, RN  Outcome: Progressing  Flowsheets (Taken 6/5/2024 2000)  Free From Fall Injury: Instruct family/caregiver on patient safety     Problem: ABCDS Injury Assessment  Goal: Absence of physical injury  Outcome: Progressing  Flowsheets (Taken 6/6/2024 0947)  Absence of Physical Injury: Implement safety measures based on patient assessment     Problem: Neurosensory - Adult  Goal: Achieves stable or improved neurological status  Outcome: Progressing  Flowsheets (Taken 6/6/2024 0800)  Achieves stable or improved neurological status:   Assess for and report changes in neurological status   Initiate measures to prevent increased intracranial pressure   Maintain blood pressure and fluid volume within ordered parameters to optimize cerebral perfusion and  Symptoms are Monitored and Maintained or Improved:   Monitor and assess patient's chronic conditions and comorbid symptoms for stability, deterioration, or improvement   Collaborate with multidisciplinary team to address chronic and comorbid conditions and prevent exacerbation or deterioration   Update acute care plan with appropriate goals if chronic or comorbid symptoms are exacerbated and prevent overall improvement and discharge     Problem: Skin/Tissue Integrity  Goal: Absence of new skin breakdown  Description: 1.  Monitor for areas of redness and/or skin breakdown  2.  Assess vascular access sites hourly  3.  Every 4-6 hours minimum:  Change oxygen saturation probe site  4.  Every 4-6 hours:  If on nasal continuous positive airway pressure, respiratory therapy assess nares and determine need for appliance change or resting period.  Outcome: Progressing

## 2024-06-07 ENCOUNTER — APPOINTMENT (OUTPATIENT)
Dept: CT IMAGING | Age: 84
DRG: 041 | End: 2024-06-07
Payer: MEDICARE

## 2024-06-07 ENCOUNTER — APPOINTMENT (OUTPATIENT)
Age: 84
DRG: 041 | End: 2024-06-07
Attending: INTERNAL MEDICINE
Payer: MEDICARE

## 2024-06-07 VITALS
BODY MASS INDEX: 27.64 KG/M2 | HEART RATE: 52 BPM | TEMPERATURE: 98.2 F | WEIGHT: 186.6 LBS | SYSTOLIC BLOOD PRESSURE: 149 MMHG | HEIGHT: 69 IN | OXYGEN SATURATION: 94 % | DIASTOLIC BLOOD PRESSURE: 76 MMHG | RESPIRATION RATE: 16 BRPM

## 2024-06-07 LAB
ANION GAP SERPL CALCULATED.3IONS-SCNC: 9 MMOL/L (ref 9–16)
BASOPHILS # BLD: 0.08 K/UL (ref 0–0.2)
BASOPHILS NFR BLD: 1 % (ref 0–2)
BUN SERPL-MCNC: 17 MG/DL (ref 8–23)
CALCIUM SERPL-MCNC: 8.9 MG/DL (ref 8.6–10.4)
CHLORIDE SERPL-SCNC: 105 MMOL/L (ref 98–107)
CO2 SERPL-SCNC: 27 MMOL/L (ref 20–31)
CREAT SERPL-MCNC: 1.2 MG/DL (ref 0.7–1.2)
EOSINOPHIL # BLD: 0.17 K/UL (ref 0–0.44)
EOSINOPHILS RELATIVE PERCENT: 3 % (ref 1–4)
ERYTHROCYTE [DISTWIDTH] IN BLOOD BY AUTOMATED COUNT: 12.5 % (ref 11.8–14.4)
GFR, ESTIMATED: 62 ML/MIN/1.73M2
GLUCOSE SERPL-MCNC: 103 MG/DL (ref 74–99)
HCT VFR BLD AUTO: 44.3 % (ref 40.7–50.3)
HGB BLD-MCNC: 14.2 G/DL (ref 13–17)
IMM GRANULOCYTES # BLD AUTO: <0.03 K/UL (ref 0–0.3)
IMM GRANULOCYTES NFR BLD: 0 %
LYMPHOCYTES NFR BLD: 1.73 K/UL (ref 1.1–3.7)
LYMPHOCYTES RELATIVE PERCENT: 27 % (ref 24–43)
MCH RBC QN AUTO: 30.5 PG (ref 25.2–33.5)
MCHC RBC AUTO-ENTMCNC: 32.1 G/DL (ref 28.4–34.8)
MCV RBC AUTO: 95.3 FL (ref 82.6–102.9)
MONOCYTES NFR BLD: 0.71 K/UL (ref 0.1–1.2)
MONOCYTES NFR BLD: 11 % (ref 3–12)
NEUTROPHILS NFR BLD: 58 % (ref 36–65)
NEUTS SEG NFR BLD: 3.74 K/UL (ref 1.5–8.1)
NRBC BLD-RTO: 0 PER 100 WBC
PLATELET # BLD AUTO: 170 K/UL (ref 138–453)
PMV BLD AUTO: 10 FL (ref 8.1–13.5)
POTASSIUM SERPL-SCNC: 4.2 MMOL/L (ref 3.7–5.3)
RBC # BLD AUTO: 4.65 M/UL (ref 4.21–5.77)
SODIUM SERPL-SCNC: 141 MMOL/L (ref 136–145)
WBC OTHER # BLD: 6.5 K/UL (ref 3.5–11.3)

## 2024-06-07 PROCEDURE — 33285 INSJ SUBQ CAR RHYTHM MNTR: CPT | Performed by: INTERNAL MEDICINE

## 2024-06-07 PROCEDURE — 2580000003 HC RX 258: Performed by: STUDENT IN AN ORGANIZED HEALTH CARE EDUCATION/TRAINING PROGRAM

## 2024-06-07 PROCEDURE — 93312 ECHO TRANSESOPHAGEAL: CPT

## 2024-06-07 PROCEDURE — 6370000000 HC RX 637 (ALT 250 FOR IP): Performed by: STUDENT IN AN ORGANIZED HEALTH CARE EDUCATION/TRAINING PROGRAM

## 2024-06-07 PROCEDURE — 2709999900 HC NON-CHARGEABLE SUPPLY: Performed by: INTERNAL MEDICINE

## 2024-06-07 PROCEDURE — 99222 1ST HOSP IP/OBS MODERATE 55: CPT | Performed by: INTERNAL MEDICINE

## 2024-06-07 PROCEDURE — 80048 BASIC METABOLIC PNL TOTAL CA: CPT

## 2024-06-07 PROCEDURE — 36415 COLL VENOUS BLD VENIPUNCTURE: CPT

## 2024-06-07 PROCEDURE — 99232 SBSQ HOSP IP/OBS MODERATE 35: CPT | Performed by: PSYCHIATRY & NEUROLOGY

## 2024-06-07 PROCEDURE — 6370000000 HC RX 637 (ALT 250 FOR IP): Performed by: INTERNAL MEDICINE

## 2024-06-07 PROCEDURE — 0JH602Z INSERTION OF MONITORING DEVICE INTO CHEST SUBCUTANEOUS TISSUE AND FASCIA, OPEN APPROACH: ICD-10-PCS | Performed by: INTERNAL MEDICINE

## 2024-06-07 PROCEDURE — 99152 MOD SED SAME PHYS/QHP 5/>YRS: CPT | Performed by: INTERNAL MEDICINE

## 2024-06-07 PROCEDURE — B24BZZ4 ULTRASONOGRAPHY OF HEART WITH AORTA, TRANSESOPHAGEAL: ICD-10-PCS | Performed by: INTERNAL MEDICINE

## 2024-06-07 PROCEDURE — 85025 COMPLETE CBC W/AUTO DIFF WBC: CPT

## 2024-06-07 PROCEDURE — 70498 CT ANGIOGRAPHY NECK: CPT

## 2024-06-07 PROCEDURE — 6360000002 HC RX W HCPCS: Performed by: INTERNAL MEDICINE

## 2024-06-07 PROCEDURE — 6360000004 HC RX CONTRAST MEDICATION

## 2024-06-07 PROCEDURE — C1892 INTRO/SHEATH,FIXED,PEEL-AWAY: HCPCS | Performed by: INTERNAL MEDICINE

## 2024-06-07 PROCEDURE — C1764 EVENT RECORDER, CARDIAC: HCPCS | Performed by: INTERNAL MEDICINE

## 2024-06-07 PROCEDURE — 97129 THER IVNTJ 1ST 15 MIN: CPT

## 2024-06-07 DEVICE — ICM LNQ22 LINQ II PRIME US
Type: IMPLANTABLE DEVICE | Status: FUNCTIONAL
Brand: LINQ II™

## 2024-06-07 RX ORDER — FAMOTIDINE 20 MG/1
20 TABLET, FILM COATED ORAL DAILY
Status: DISCONTINUED | OUTPATIENT
Start: 2024-06-08 | End: 2024-06-07 | Stop reason: HOSPADM

## 2024-06-07 RX ORDER — SODIUM CHLORIDE 0.9 % (FLUSH) 0.9 %
5-40 SYRINGE (ML) INJECTION EVERY 12 HOURS SCHEDULED
Status: CANCELLED | OUTPATIENT
Start: 2024-06-07

## 2024-06-07 RX ORDER — ROSUVASTATIN CALCIUM 20 MG/1
20 TABLET, COATED ORAL NIGHTLY
Qty: 30 TABLET | Refills: 3 | Status: SHIPPED | OUTPATIENT
Start: 2024-06-07

## 2024-06-07 RX ORDER — LIDOCAINE HYDROCHLORIDE 20 MG/ML
SOLUTION OROPHARYNGEAL PRN
Status: DISCONTINUED | OUTPATIENT
Start: 2024-06-07 | End: 2024-06-07 | Stop reason: HOSPADM

## 2024-06-07 RX ORDER — MIDAZOLAM HYDROCHLORIDE 1 MG/ML
INJECTION INTRAMUSCULAR; INTRAVENOUS PRN
Status: DISCONTINUED | OUTPATIENT
Start: 2024-06-07 | End: 2024-06-07 | Stop reason: HOSPADM

## 2024-06-07 RX ORDER — SODIUM CHLORIDE 0.9 % (FLUSH) 0.9 %
5-40 SYRINGE (ML) INJECTION PRN
Status: CANCELLED | OUTPATIENT
Start: 2024-06-07

## 2024-06-07 RX ORDER — ROSUVASTATIN CALCIUM 10 MG/1
20 TABLET, COATED ORAL NIGHTLY
Status: DISCONTINUED | OUTPATIENT
Start: 2024-06-07 | End: 2024-06-07 | Stop reason: HOSPADM

## 2024-06-07 RX ORDER — SODIUM CHLORIDE 9 MG/ML
INJECTION, SOLUTION INTRAVENOUS PRN
Status: CANCELLED | OUTPATIENT
Start: 2024-06-07

## 2024-06-07 RX ORDER — CLOPIDOGREL BISULFATE 75 MG/1
75 TABLET ORAL DAILY
Qty: 30 TABLET | Refills: 3 | Status: SHIPPED | OUTPATIENT
Start: 2024-06-08

## 2024-06-07 RX ORDER — ASPIRIN 81 MG/1
81 TABLET ORAL DAILY
Qty: 86 TABLET | Refills: 0 | Status: SHIPPED | OUTPATIENT
Start: 2024-06-08 | End: 2024-09-02

## 2024-06-07 RX ORDER — FENTANYL CITRATE 50 UG/ML
INJECTION, SOLUTION INTRAMUSCULAR; INTRAVENOUS PRN
Status: DISCONTINUED | OUTPATIENT
Start: 2024-06-07 | End: 2024-06-07 | Stop reason: HOSPADM

## 2024-06-07 RX ORDER — CLOPIDOGREL BISULFATE 75 MG/1
75 TABLET ORAL DAILY
Qty: 30 TABLET | Refills: 3 | Status: SHIPPED | OUTPATIENT
Start: 2024-06-08 | End: 2024-06-07

## 2024-06-07 RX ADMIN — CITALOPRAM 40 MG: 20 TABLET, FILM COATED ORAL at 11:42

## 2024-06-07 RX ADMIN — ASPIRIN 81 MG: 81 TABLET, COATED ORAL at 11:41

## 2024-06-07 RX ADMIN — FAMOTIDINE 20 MG: 20 TABLET, FILM COATED ORAL at 11:40

## 2024-06-07 RX ADMIN — TAMSULOSIN HYDROCHLORIDE 0.4 MG: 0.4 CAPSULE ORAL at 11:42

## 2024-06-07 RX ADMIN — CLOPIDOGREL BISULFATE 75 MG: 75 TABLET ORAL at 11:42

## 2024-06-07 RX ADMIN — IOPAMIDOL 90 ML: 755 INJECTION, SOLUTION INTRAVENOUS at 09:12

## 2024-06-07 RX ADMIN — SODIUM CHLORIDE, PRESERVATIVE FREE 10 ML: 5 INJECTION INTRAVENOUS at 11:42

## 2024-06-07 ASSESSMENT — ENCOUNTER SYMPTOMS
WHEEZING: 0
ABDOMINAL PAIN: 0
COUGH: 0
DIARRHEA: 0
NAUSEA: 0
VOMITING: 0
SHORTNESS OF BREATH: 0
BLOOD IN STOOL: 0
CHEST TIGHTNESS: 0
ABDOMINAL DISTENTION: 0
STRIDOR: 0
CONSTIPATION: 0

## 2024-06-07 ASSESSMENT — PAIN SCALES - GENERAL: PAINLEVEL_OUTOF10: 0

## 2024-06-07 NOTE — CONSULTS
Cardiology Consultation         Date:   6/7/2024  Patient name: Guru Ricci  Date of admission:  6/4/2024  6:23 AM  MRN:   [id number]  YOB: 1940    Reason for Admission: acute ischemic cva      Chief Complaint: right sided weakness     History of present illness:     83 year old male with no prior cardiac hx admitted after sudden onset of right sided weakness, found to have left distal MI and proximal M2 thrombus. His symptoms have now resolved. He denies any chest pain, dyspnea, palpitations, orthopnea or leg edema in recent past.    Cardiology consulted to r/o any cardioembolic source of cva.       Past Medical History:   has a past medical history of BPH (benign prostatic hyperplasia), GERD (gastroesophageal reflux disease), HLD (hyperlipidemia), and HTN (hypertension).    Past Surgical History:   has no past surgical history on file.     Home Medications:    Prior to Admission medications    Medication Sig Start Date End Date Taking? Authorizing Provider   rosuvastatin (CRESTOR) 10 MG tablet Take 1 tablet by mouth daily   Yes Danika Sarah MD   famotidine (PEPCID) 20 MG tablet Take 1 tablet by mouth 2 times daily   Yes Danika Sarah MD   citalopram (CELEXA) 40 MG tablet Take 1 tablet by mouth daily   Yes Danika Sarah MD   lisinopril (PRINIVIL;ZESTRIL) 2.5 MG tablet Take 1 tablet by mouth daily   Yes Danika Sarah MD   tamsulosin (FLOMAX) 0.4 MG capsule Take 1 capsule by mouth daily   Yes Danika Sarah MD   aspirin 81 MG chewable tablet Take 1 tablet by mouth daily   Yes Danika Sarah MD       Allergies:  Sulfa antibiotics    Social History:        Family History:      REVIEW OF SYSTEMS:    Constitutional: there has been no unanticipated weight loss. No change in functional capacity.     Eyes: No visual changes or diplopia.   ENT: No Headaches, hearing loss or vertigo. No mouth sores or sore throat.  Cardiovascular: as described in  HPI   Respiratory: No hx of productive cough, pleuritic chest pain   Gastrointestinal: No abdominal pain, appetite loss, blood in stools. No change in bowel habits.  Genitourinary: No dysuria, trouble voiding, or hematuria.  Musculoskeletal:  No gait disturbance, No weakness or joint complaints.  Integumentary: No rash or pruritis.  Neurological: +ve for right sided weakness   Psychiatric: No anxiety, or depression.  Endocrine: No temperature intolerance. No excessive thirst, fluid intake, or urination. No tremor.  Hematologic/Lymphatic: No abnormal bruising or bleeding, blood clots or swollen lymph nodes.  Allergic/Immunologic: No nasal congestion or hives.    PHYSICAL EXAM:    Physical Examination:    /80   Pulse 53   Temp 97.5 °F (36.4 °C) (Oral)   Resp 17   Wt 84.6 kg (186 lb 9.6 oz)   SpO2 96%      Constitutional and General Appearance: alert, cooperative, in no distress   HEENT: PERRL, no cervical lymphadenopathy. Normal oral mucosa  Respiratory:  Normal excursion and expansion without use of accessory muscles  Resp Auscultation: Good respiratory effort. No for increased work of breathing. On auscultation: clear to auscultation bilaterally, no wheezing, no rales.   Cardiovascular:  The apical impulse is not displaced  Heart tones are crisp and normal. regular S1 and S2.   Murmurs: None   Jugular venous pulsation Normal  Thre is no carotid bruit   Peripheral pulses are symmetrical and full   Abdomen:  No masses or tenderness  Bowel sounds present  Extremities:   No Cyanosis or Clubbing   Lower extremity edema: No edema    Skin: Warm and dry  Neurological:  Not done     DATA:    Diagnostics:      EKG: pending         Previous cardiac testing:     None       Labs:     CBC:   Recent Labs     06/06/24  0443 06/07/24  0412   WBC 6.9 6.5   HGB 13.8 14.2   HCT 41.9 44.3    170     BMP:   Recent Labs     06/06/24  0443 06/07/24  0412    141   K 4.3 4.2   CO2 26 27   BUN 14 17   CREATININE 1.1

## 2024-06-07 NOTE — PROCEDURES
ROSMERY/Cardioversion Note        Date of the procedure: 6/7/24     Indication:     : Karin Watters MD     Patient seen and examined. History and Physical reviewed. Labs reviewed.    The informed consent was obtained with explanation of the risks and benefits.     All sedation was administered by the cardiologist.     The oropharynx was pre anesthetisized with cetacaine spray.    ROSMERY Findings:    Structures:  LA: Normal  LIZABETH: No thrombus  RA: Normal  RV:  Normal  LV: Normal in size with normal systolic function   Estimated LVEF: 55%  Aorta: Mild atheromatous disease arch  Percardium: No pericardial effusion  Septum: No intracardiac shunt via color Doppler. Evidence of right to left interatrial shunt via injection of agitated saline.    Valves:  Mitral Valve: Structurally normal. Mild regurgitation is identified. No obvious vegetations or thrombus seen.   Aortic Valve: The aortic valve is trileaflet and opens adequately. No regurgitiation is identified. No obvious vegetations or thrombus seen.   Tricuspid valve: Structurally normal. No regurgitation is identified. No obvious vegetations or thrombus seen.   Pulmonary valve: Normal. No significant regurgitation. No obvious vegetations or thrombus seen.       Impression:     1. A ROSMERY was performed without complications.  2. Normal Lv size with normal systolic function. Estimated LVEF 55%  3. No thrombus or valvular vegetation identified  4. Positive bubble study for interatrial shunt suggesting a PFO.     Electronically signed by Karin Watters MD on 6/7/2024 at 11:22 AM  [facility]

## 2024-06-07 NOTE — PROGRESS NOTES
confrontation  III,IV,VI -  PERR, EOMs full, no ptosis  V     -     Normal facial sensation   VII    -     Normal facial symmetry  VIII   -     Intact hearing   IX,X -     Symmetrical palate  XI    -     Symmetrical shoulder shrug  XII   -     Midline tongue, no atrophy    MOTOR FUNCTION: RUE: Significant for good strength of grade 5/5 in proximal and distal muscle groups   LUE: Significant for good strength of grade 5/5 in proximal and distal muscle groups   RLE: Significant for good strength of grade 5/5 in proximal and distal muscle groups   LLE: Significant for good strength of grade 5/5 in proximal and distal muscle groups      Normal bulk, normal tone and no involuntary movements, no tremor   SENSORY FUNCTION:  Normal touch, normal pinprick, normal vibration, normal proprioception   CEREBELLAR FUNCTION:  Intact fine motor control over upper limbs and lower limbs   REFLEX FUNCTION:  Symmetric in upper and lower extremities, no Babinski sign   STATION and GAIT  Normal gait and tandem station, normal tip toes and heel walking       Investigations   Laboratory Testing:  Recent Results (from the past 24 hour(s))   Basic Metabolic Panel w/ Reflex to MG    Collection Time: 06/07/24  4:12 AM   Result Value Ref Range    Sodium 141 136 - 145 mmol/L    Potassium 4.2 3.7 - 5.3 mmol/L    Chloride 105 98 - 107 mmol/L    CO2 27 20 - 31 mmol/L    Anion Gap 9 9 - 16 mmol/L    Glucose 103 (H) 74 - 99 mg/dL    BUN 17 8 - 23 mg/dL    Creatinine 1.2 0.70 - 1.20 mg/dL    Est, Glom Filt Rate 62 >60 mL/min/1.73m2    Calcium 8.9 8.6 - 10.4 mg/dL   CBC with Auto Differential    Collection Time: 06/07/24  4:12 AM   Result Value Ref Range    WBC 6.5 3.5 - 11.3 k/uL    RBC 4.65 4.21 - 5.77 m/uL    Hemoglobin 14.2 13.0 - 17.0 g/dL    Hematocrit 44.3 40.7 - 50.3 %    MCV 95.3 82.6 - 102.9 fL    MCH 30.5 25.2 - 33.5 pg    MCHC 32.1 28.4 - 34.8 g/dL    RDW 12.5 11.8 - 14.4 %    Platelets 170 138 - 453 k/uL    MPV 10.0 8.1 - 13.5 fL    NRBC  cysts, and 2 mm nonobstructing left renal stone  Checking UA and urine culture   plan cystoscopy as outpatient    Consults:  Physical therapy  Occupational therapy  Speech therapy  Case Management  Social Work    Diet: Regular adult diet  DVT Prophylaxis: Lovenox, held due to hematuria  Discharge Planning:    Patient to be discussed with attending physician, Dr. Roshan Spencer MD  Transitional Year Resident PGY-1  6/7/2024 10:10 AM    Copy sent to Bentley Agudelo DO    This note is created with the assistance of a speech-recognition program. While intending to generate a document that actually reflects the content of the visit, the document can still have some errors including those of syntax and sound a- like substitutions which may escape proofreading. In such instances, actual meaning can be extrapolated by contextual derivation.

## 2024-06-07 NOTE — PROGRESS NOTES
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       Pain: neck pain, pt states RN has been notified and that pain has been ongoing    Cognitive Treatment    Treatment time: 1400 - 1416      Subjective: [x] Alert [x] Cooperative     [] Confused     [] Agitated    [] Lethargic      Objective/Assessment:  Attention: WFL    Orientation: WFL    Recall: delayed recall 0/3, 2/3, 3/3 (all 3/3 with mod-max verbal cue)    Problem Solving/Reasoning: opposites 9/12 (no increase with max cues), memory and mental manipulation size 8/10 (10/10 with min verbal cue), problem solving answering questions 6/12 (12/12 with min-mod verbal cue)    Plan:  [x] Continue ST services    [] Discharge from ST:      Discharge recommendations: []  Further therapy recommended at discharge.The patient should be able to tolerate at least 3 hours of therapy per day over 5 days or 15 hours over 7 days. [x] Further therapy recommended at discharge.   [] No therapy recommended at discharge.    Completed by: Siena Swanson  Clinician  Cosigned By: Lottie Toro M.S.CCC/SLP

## 2024-06-07 NOTE — PROGRESS NOTES
Pharmacy Note     Renal Dose Adjustment    Guru Ricci is a 83 y.o. male. Pharmacist assessment of renally cleared medications.    Recent Labs     06/06/24  0443 06/07/24  0412   BUN 14 17       Recent Labs     06/06/24  0443 06/07/24  0412   CREATININE 1.1 1.2       Estimated Creatinine Clearance: 47 mL/min (based on SCr of 1.2 mg/dL).    Height:   Ht Readings from Last 1 Encounters:   06/07/24 1.753 m (5' 9\")     Weight:  Wt Readings from Last 1 Encounters:   06/04/24 84.6 kg (186 lb 9.6 oz)       The following medication dose has been adjusted based upon renal function per P&T Guidelines:             Famotidine 20 mg PO BID --> Famotidine 20 mg PO daily     Kirsten Covington, PharmD, [facility] 6/7/2024 12:07 PM ]

## 2024-06-07 NOTE — PLAN OF CARE
Problem: Discharge Planning  Goal: Discharge to home or other facility with appropriate resources  Outcome: Progressing  Flowsheets (Taken 6/6/2024 2000)  Discharge to home or other facility with appropriate resources: Identify barriers to discharge with patient and caregiver

## 2024-06-07 NOTE — PROCEDURES
Cardiac Catheterization Lab       Today's Date:  6/7/2024  Patient name:  Guru Ricci  MRN:   [id number]  YOB: 1940  PCP:    Bentley Bauer DO    Procedure: Implantation of subcutaneous rhythm monitor     : Karin Watters MD     Indication: Cryptogenic CVA     Procedure:  After the usual preparation of the left neck and chest, the patient was draped in the usual sterile manner.  Local anesthesia was infused below the left 4th intercostal space from the midline laterally. An incision was made below the left breast, lateral to midline. The incision was dilated.  The Loop recorder System was advanced using the standard techniques, and positioned successfully with no bleeding or complication. The incision was then closed with derma bond glue.     Impression / Device:  Successful Implantation of subcutaneous rhythm monitor (Loop recorder - [employer] LNQ)     SN: [id number]    Karin Watters MD

## 2024-06-07 NOTE — PROGRESS NOTES
keenly responsive  1b.  Level of consciousness questions:  0 - answers both questions correctly  1c.  Level of consciousness questions:  0 - performs both tasks correctly  2.    Best Gaze:  0 - normal  3.    Visual:  0 - no visual loss  4.    Facial Palsy:  1 - minor paralysis (flattened nasolabial fold, asymmetric on smiling)  5a.  Motor left arm:  0 - no drift, limb holds 90 (or 45) degrees for full 10 seconds  5b.  Motor right arm:  0 - no drift, limb holds 90 (or 45) degrees for full 10 seconds  6a.  Motor left le - no drift; leg holds 30 degree position for full 5 seconds  6b.  Motor right le - no drift; leg holds 30 degree position for full 5 seconds  7.    Limb Ataxia:  0 - absent  8.    Sensory:  0 - normal; no sensory loss  9.    Best Language:  0 - no aphasia, normal  10.  Dysarthria:  1 - mild to moderate, patient slurs at least some words and at worst, can be understood with some difficulty  11.  Extinction and Inattention:  0 - no abnormality      MRS: 2      LABS:   Reviewed.  Lab Results   Component Value Date    HGB 14.2 2024    WBC 6.5 2024     2024     2024    BUN 17 2024    CREATININE 1.2 2024    APTT 29.3 2024    INR 1.1 2024      No results found for: \"COVID19\"    RADIOLOGY:   Images were personally reviewed including:    CTA CTP and CTH              Repeat CTP on 2024        Repeat CTA on 2024  ASSESSMENT:     Patient is an 84 YO M with PMH as above, presenting following a fall and speech changes. Found to have M1M2 intralumnial thrombus. Large penumbra with small core infarct. NIHSS sig improved to 3 during examination.      Distal M1 m2 intraluminal thrombus, with low NIHSS, and improving.   Possible seizure activity given LOC during event and seemingly hx of seizures.   PLAN:     Continue on asa 81 mg and Plavix 75 mg daily for 21 days followed by monotherapy  Pending CTA review further medication adjustment then  might be necessary.   [place] as per primary team      Case discussed with Dr. Beaver PGY7 Endovascular neurosurgery fellow.    CHIRAG Cintron MD   Stroke, Neurocritical Care & Neurointervention  [facility]  [facility] Stroke [facility] [place]  Electronically signed 6/7/2024 at 10:00 AM

## 2024-06-07 NOTE — PROGRESS NOTES
ADMITTED TO [facility] ROOM 2  ALL PROCEDURES EXPLAINED PRIOR TO IMPLEMENTATION. PATIENT READY FOR PROCEDURE

## 2024-06-09 NOTE — PROGRESS NOTES
CLINICAL PHARMACY NOTE: MEDS TO BEDS    Total # of Prescriptions Filled: 3   The following medications were delivered to the patient:  Clopidogrel 75mg tabs  Rosuvastatin 20mg tabs  Aspirin 81mg tbas    Additional Documentation:  Delivered to pt + 1 in room 140 on 6/7 at 6:05P. Copay was $1.54 paid with [employer]

## 2024-06-09 NOTE — DISCHARGE SUMMARY
[facility] [place]     Department of Neurology    INPATIENT DISCHARGE SUMMARY        Patient Identification:  Guru Ricci is a 83 y.o. male.  :  1940  MRN: [id number]     Acct: [id number]   Admit Date:  2024  Discharge date and time: 2024  6:20 PM   Attending Provider: No att. 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 *       Consults:   urology    Brief Inpatient course:    Guru Ricci is a 83 y.o.  male with a history of hypertension, hyperlipidemia, BPH who presented with acute onset right-sided hemiparesis, aphasia, and was found to have left M1/M2 occlusion with intraluminal thrombus.  Large area of penumbra left MCA region.  Patient was started on triple therapy.  MRI brain read as no acute intracranial abnormalities.  Residual mild dysarthria, decreased right nasolabial fold.  No right-sided weakness.  Isolated remote seizure history, EEG normal.  Neuroendovascular was consulted and repeated CT perfusion which showed no perfusion mismatch.  They were agreeable for discharge and repeat CTA in 3 months.  Discharged on DAPT for 21 days followed by monotherapy.  S/p ROSMERY and loop.      Patient is stable from neurological standpoint to be discharged.    Procedures:  CTP, LTME    Any Hospital Acquired Infections: none    Discharge Functional Status:  stable    Disposition: home    Patient Instructions:   Continue ASA and Plavix for 21 days, then monotherapy      Discharge Medications:  Discharge Medication List as of 2024  5:47 PM        START taking these medications    Details   aspirin 81 MG EC tablet Take 1 tablet by mouth

## 2024-06-09 NOTE — PLAN OF CARE
Plan of care:    After review of repeat CTA completed on 6/7.    Discussed with primary team that given  more distal thrombus and irregularities at vessel, we will continue DAPT for 90 days until patient follows up with Endovascular neurosurgery team in 3 months as instructed.    Patient to obtain CTA head and neck prior to follow-up in 3 months.

## 2024-07-03 NOTE — TELEPHONE ENCOUNTER
Incoming fax requesting refill for Aspirin. Please review and e-scribe if applicable.     Last Visit Date: Never seen, last filled 06/08/2024    Next Visit Date: None

## 2024-07-07 RX ORDER — ASPIRIN 81 MG/1
81 TABLET ORAL DAILY
Qty: 86 TABLET | Refills: 0 | Status: SHIPPED | OUTPATIENT
Start: 2024-07-07 | End: 2024-10-01

## 2024-07-10 ENCOUNTER — TELEPHONE (OUTPATIENT)
Age: 84
End: 2024-07-10

## 2024-07-10 NOTE — TELEPHONE ENCOUNTER
Patient called in stating his Loop recorder was not working right. Spoke to Melissa in Dr. Raymond office to see if they where following patient. She stated that they were going to follow his loop recorder. She will upload info and then his machine should work. I did call Guru back to let him what was going on. I did let patient know to wait until after 1 PM to plug the machine in to transmit for his loop recorder. Patient understood and I have him the number to Dr. Mayes office if there is any other issues.

## 2024-09-03 ENCOUNTER — HOSPITAL ENCOUNTER (OUTPATIENT)
Dept: CT IMAGING | Age: 84
Discharge: HOME | End: 2024-09-05
Payer: MEDICARE

## 2024-09-03 DIAGNOSIS — I63.412 CEREBROVASCULAR ACCIDENT (CVA) DUE TO EMBOLISM OF LEFT MIDDLE CEREBRAL ARTERY (HCC): ICD-10-CM

## 2024-09-03 DIAGNOSIS — I63.9 CEREBROVASCULAR ACCIDENT (CVA), UNSPECIFIED MECHANISM (HCC): ICD-10-CM

## 2024-09-03 LAB
EGFR, POC: 75 ML/MIN/1.73M2
POC CREATININE: 1 MG/DL (ref 0.51–1.19)

## 2024-09-03 PROCEDURE — 6360000004 HC RX CONTRAST MEDICATION

## 2024-09-03 PROCEDURE — 82565 ASSAY OF CREATININE: CPT

## 2024-09-03 PROCEDURE — 70496 CT ANGIOGRAPHY HEAD: CPT

## 2024-09-03 RX ORDER — IOPAMIDOL 755 MG/ML
90 INJECTION, SOLUTION INTRAVASCULAR
Status: COMPLETED | OUTPATIENT
Start: 2024-09-03 | End: 2024-09-03

## 2024-09-03 RX ADMIN — IOPAMIDOL 90 ML: 755 INJECTION, SOLUTION INTRAVENOUS at 10:34

## (undated) DEVICE — ELECTRODE PT RET AD L9FT HI MOIST COND ADH HYDRGEL CORDED

## (undated) DEVICE — SPONGE LAP W18XL18IN WHT COT 4 PLY FLD STRUNG RADPQ DISP ST 2 PER PACK

## (undated) DEVICE — KIT MICRO INTRO 4FR STIFF 40CM NIGHTENALL TUNGSTEN 7SMT

## (undated) DEVICE — Device

## (undated) DEVICE — SUTURE VICRYL 2-0 L27IN ABSRB CT BRAID COAT UD J275H

## (undated) DEVICE — MEDI-TRACE CADENCE ADULT, DEFIBRILLATION ELECTRODE -RTS  (10 PR/PK) - PHYSIO-CONTROL: Brand: MEDI-TRACE CADENCE

## (undated) DEVICE — STRAP ARMBRD W1.5XL32IN FOAM STR YET SFT W/ HK AND LOOP

## (undated) DEVICE — Z DISCONTINUED USE 2859063 SUTURE VICRYL 3-0 L27IN ABSRB UD PSL L30MM 1/2 CIR TAPERPOINT J502H